# Patient Record
Sex: FEMALE | Race: WHITE | NOT HISPANIC OR LATINO | Employment: PART TIME | URBAN - NONMETROPOLITAN AREA
[De-identification: names, ages, dates, MRNs, and addresses within clinical notes are randomized per-mention and may not be internally consistent; named-entity substitution may affect disease eponyms.]

---

## 2017-01-06 ENCOUNTER — ROUTINE PRENATAL (OUTPATIENT)
Dept: OBSTETRICS AND GYNECOLOGY | Facility: CLINIC | Age: 27
End: 2017-01-06

## 2017-01-06 VITALS — WEIGHT: 225 LBS | SYSTOLIC BLOOD PRESSURE: 110 MMHG | BODY MASS INDEX: 43.94 KG/M2 | DIASTOLIC BLOOD PRESSURE: 80 MMHG

## 2017-01-06 DIAGNOSIS — Z34.03 ENCOUNTER FOR SUPERVISION OF NORMAL FIRST PREGNANCY IN THIRD TRIMESTER: Primary | ICD-10-CM

## 2017-01-06 PROCEDURE — 99213 OFFICE O/P EST LOW 20 MIN: CPT | Performed by: OBSTETRICS & GYNECOLOGY

## 2017-01-06 PROCEDURE — 76816 OB US FOLLOW-UP PER FETUS: CPT | Performed by: OBSTETRICS & GYNECOLOGY

## 2017-01-06 NOTE — MR AVS SNAPSHOT
Veda Fischer   2017 9:45 AM   Routine Prenatal    Dept Phone:  [phone]   Encounter #:  [id number]    Provider:  Curt Julien MD   Department:  [facility]                Your Full Care Plan              Your Updated Medication List          This list is accurate as of: 17 10:21 AM.  Always use your most recent med list.                PRENATAL VITAMINS PO               You Were Diagnosed With        Codes Comments    Encounter for supervision of normal first pregnancy in third trimester    -  Primary ICD-10-CM: Z34.03  ICD-9-CM: V22.0       Instructions     None    Patient Instructions History      Upcoming Appointments     Visit Type Date Time Department    OB FOLLOWUP 2017  9:45 AM [facility]    OB ULTRASOUND 2017 11:15 AM [facility]    OB FOLLOWUP 1/10/2017 10:15 AM [facility]    OB FOLLOWUP 2017  8:00 AM [facility]    OB [facility]UP 2017  9:45 AM [facility]      [employer] Signup     [facility] [employer] allows you to send messages to your doctor, view your test results, renew your prescriptions, schedule appointments, and more. To sign up, go to [employer] and click on the Sign Up Now link in the New User? box. Enter your [employer] Activation Code exactly as it appears below along with the last four digits of your Social Security Number and your Date of Birth () to complete the sign-up process. If you do not sign up before the expiration date, you must request a new code.    [employer] Activation Code: [email or web address]  Expires: 2017  4:36 AM    If you have questions, you can email [email or web address] or call [phone] to talk to our [employer] staff. Remember, [employer] is NOT to be used for urgent needs. For medical emergencies, dial 911.               Other Info from Your Visit           Your Appointments     2017 11:15 AM CST   OB ULTRASOUND with  ULTRASOUND 1 OBGYN [facility] (--)    [address] [id number] [phone]            José Miguel 10, 2017 10:15 AM CST   OB FOLLOWUP with Phill Ashley MD   [facility] (--)    [address] [id number] [phone]            Jan 17, 2017  8:00 AM CST   OB FOLLOWUP with Phill Ashley MD   [facility] (--)    [address] [id number] [phone]            Jan 20, 2017  9:45 AM CST   OB FOLLOWUP with Phill Ashley MD   [facility] (--)    [address] [id number] [phone]              Allergies     No Known Allergies      Reason for Visit     Routine Prenatal Visit           Vital Signs     Blood Pressure Weight Last Menstrual Period Body Mass Index Smoking Status       110/80 225 lb (102 kg) 04/13/2016 43.94 kg/m2 Former Smoker       Problems and Diagnoses Noted     Prenatal care

## 2017-01-06 NOTE — PROGRESS NOTES
US for size greater than dates  Cervix anterior and moderate, but head not engaged  Has appt on Tuesday, will discuss US results then

## 2017-01-10 ENCOUNTER — ROUTINE PRENATAL (OUTPATIENT)
Dept: OBSTETRICS AND GYNECOLOGY | Facility: CLINIC | Age: 27
End: 2017-01-10

## 2017-01-10 VITALS — BODY MASS INDEX: 43.94 KG/M2 | WEIGHT: 225 LBS | DIASTOLIC BLOOD PRESSURE: 78 MMHG | SYSTOLIC BLOOD PRESSURE: 114 MMHG

## 2017-01-10 DIAGNOSIS — F17.200 SMOKER: Primary | ICD-10-CM

## 2017-01-10 DIAGNOSIS — Z34.93 PRENATAL CARE, THIRD TRIMESTER: ICD-10-CM

## 2017-01-10 PROCEDURE — 99212 OFFICE O/P EST SF 10 MIN: CPT | Performed by: OBSTETRICS & GYNECOLOGY

## 2017-01-10 NOTE — PROGRESS NOTES
Kick count instructions were reviewed and encouraged.  Labor signs and symptoms were reviewed.  Patient was encouraged to come to hospital or call for bleeding, leakage of fluid or any other concerns.    8# EFW last week.  Wants expectant management.  Head still not engaged.

## 2017-01-10 NOTE — MR AVS SNAPSHOT
Veda Fischer   1/10/2017 10:15 AM   Routine Prenatal    Dept Phone:  [phone]   Encounter #:  [id number]    Provider:  Phill Ashley MD   Department:  [facility]                Your Full Care Plan              Your Updated Medication List          This list is accurate as of: 1/10/17 10:47 AM.  Always use your most recent med list.                PRENATAL VITAMINS PO               You Were Diagnosed With        Codes Comments    Smoker    -  Primary ICD-10-CM: F17.200  ICD-9-CM: 305.1     Prenatal care, third trimester     ICD-10-CM: Z34.93  ICD-9-CM: V22.1       Instructions     None    Patient Instructions History      Upcoming Appointments     Visit Type Date Time Department    OB FOLLOWUP 1/10/2017 10:15 [facility]    [facility] 2017  8:00 AM [facility]    [facility] FOLLOWUP 2017  9:45 AM [facility]      [employer] Signup     [facility] [employer] allows you to send messages to your doctor, view your test results, renew your prescriptions, schedule appointments, and more. To sign up, go to [employer] and click on the Sign Up Now link in the New User? box. Enter your [employer] Activation Code exactly as it appears below along with the last four digits of your Social Security Number and your Date of Birth () to complete the sign-up process. If you do not sign up before the expiration date, you must request a new code.    [employer] Activation Code: [email or web address]  Expires: 2017  4:36 AM    If you have questions, you can email [email or web address] or call [phone] to talk to our [employer] staff. Remember, [employer] is NOT to be used for urgent needs. For medical emergencies, dial 911.               Other Info from Your Visit           Your Appointments     2017  8:00 AM CST   OB FOLLOWUP with Phill Ashley MD   [facility] (--)    [address] [place] [address]  [id number] [phone]            Jan 20, 2017  9:45 AM CST   OB FOLLOWUP with Phill Ashley MD   [facility] (--)    [address] [place] [id number] [phone]              Allergies     No Known Allergies      Reason for Visit     Routine Prenatal Visit           Vital Signs     Blood Pressure Weight Last Menstrual Period Body Mass Index Smoking Status       114/78 225 lb (102 kg) 04/13/2016 43.94 kg/m2 Former Smoker       Problems and Diagnoses Noted     Smoker    -  Primary    Prenatal care

## 2017-01-14 PROBLEM — O47.9 IRREGULAR CONTRACTIONS: Status: ACTIVE | Noted: 2017-01-14

## 2017-01-16 ENCOUNTER — HOSPITAL ENCOUNTER (INPATIENT)
Facility: HOSPITAL | Age: 27
LOS: 4 days | Discharge: HOME | End: 2017-01-20
Attending: OBSTETRICS & GYNECOLOGY | Admitting: OBSTETRICS & GYNECOLOGY

## 2017-01-16 ENCOUNTER — ANESTHESIA (OUTPATIENT)
Dept: LABOR AND DELIVERY | Facility: HOSPITAL | Age: 27
End: 2017-01-16

## 2017-01-16 ENCOUNTER — ANESTHESIA EVENT (OUTPATIENT)
Dept: LABOR AND DELIVERY | Facility: HOSPITAL | Age: 27
End: 2017-01-16

## 2017-01-16 DIAGNOSIS — Z37.9 NORMAL LABOR: ICD-10-CM

## 2017-01-16 DIAGNOSIS — O33.4XX1: ICD-10-CM

## 2017-01-16 DIAGNOSIS — O47.9 IRREGULAR CONTRACTIONS: Primary | ICD-10-CM

## 2017-01-16 LAB
A1 MICROGLOB PLACENTAL VAG QL: NEGATIVE
ABO GROUP BLD: NORMAL
BLD GP AB SCN SERPL QL: NEGATIVE
DEPRECATED RDW RBC AUTO: 42.8 FL (ref 40–54)
ERYTHROCYTE [DISTWIDTH] IN BLOOD BY AUTOMATED COUNT: 14.4 % (ref 12–15)
HCT VFR BLD AUTO: 37.6 % (ref 37–47)
HGB BLD-MCNC: 12.7 G/DL (ref 12–16)
MCH RBC QN AUTO: 28 PG (ref 28–32)
MCHC RBC AUTO-ENTMCNC: 33.8 G/DL (ref 33–36)
MCV RBC AUTO: 82.8 FL (ref 82–98)
PLATELET # BLD AUTO: 245 10*3/MM3 (ref 130–400)
PMV BLD AUTO: 11.3 FL (ref 6–12)
RBC # BLD AUTO: 4.54 10*6/MM3 (ref 4.2–5.4)
RH BLD: POSITIVE
WBC NRBC COR # BLD: 17.99 10*3/MM3 (ref 4.8–10.8)

## 2017-01-16 PROCEDURE — 86900 BLOOD TYPING SEROLOGIC ABO: CPT

## 2017-01-16 PROCEDURE — 86850 RBC ANTIBODY SCREEN: CPT

## 2017-01-16 PROCEDURE — 25010000002 PENICILLIN G POTASSIUM PER 600000 UNITS: Performed by: OBSTETRICS & GYNECOLOGY

## 2017-01-16 PROCEDURE — 85027 COMPLETE CBC AUTOMATED: CPT | Performed by: OBSTETRICS & GYNECOLOGY

## 2017-01-16 PROCEDURE — 25010000002 BUTORPHANOL PER 1 MG: Performed by: OBSTETRICS & GYNECOLOGY

## 2017-01-16 PROCEDURE — 84112 EVAL AMNIOTIC FLUID PROTEIN: CPT | Performed by: OBSTETRICS & GYNECOLOGY

## 2017-01-16 PROCEDURE — 86901 BLOOD TYPING SEROLOGIC RH(D): CPT

## 2017-01-16 PROCEDURE — 25010000003 CEFAZOLIN PER 500 MG: Performed by: OBSTETRICS & GYNECOLOGY

## 2017-01-16 PROCEDURE — 86920 COMPATIBILITY TEST SPIN: CPT

## 2017-01-16 PROCEDURE — C1765 ADHESION BARRIER: HCPCS | Performed by: OBSTETRICS & GYNECOLOGY

## 2017-01-16 RX ORDER — PROMETHAZINE HYDROCHLORIDE 25 MG/ML
12.5 INJECTION, SOLUTION INTRAMUSCULAR; INTRAVENOUS EVERY 6 HOURS PRN
Status: DISCONTINUED | OUTPATIENT
Start: 2017-01-16 | End: 2017-01-17 | Stop reason: HOSPADM

## 2017-01-16 RX ORDER — PROMETHAZINE HYDROCHLORIDE 25 MG/1
12.5 TABLET ORAL EVERY 6 HOURS PRN
Status: DISCONTINUED | OUTPATIENT
Start: 2017-01-16 | End: 2017-01-17 | Stop reason: HOSPADM

## 2017-01-16 RX ORDER — SODIUM CHLORIDE 0.9 % (FLUSH) 0.9 %
1-10 SYRINGE (ML) INJECTION AS NEEDED
Status: DISCONTINUED | OUTPATIENT
Start: 2017-01-16 | End: 2017-01-17 | Stop reason: HOSPADM

## 2017-01-16 RX ORDER — MISOPROSTOL 200 UG/1
800 TABLET ORAL AS NEEDED
Status: DISCONTINUED | OUTPATIENT
Start: 2017-01-16 | End: 2017-01-17 | Stop reason: HOSPADM

## 2017-01-16 RX ORDER — LIDOCAINE HYDROCHLORIDE AND EPINEPHRINE BITARTRATE 20; .01 MG/ML; MG/ML
INJECTION, SOLUTION SUBCUTANEOUS AS NEEDED
Status: DISCONTINUED | OUTPATIENT
Start: 2017-01-16 | End: 2017-01-17 | Stop reason: SURG

## 2017-01-16 RX ORDER — TERBUTALINE SULFATE 1 MG/ML
0.25 INJECTION, SOLUTION SUBCUTANEOUS AS NEEDED
Status: DISCONTINUED | OUTPATIENT
Start: 2017-01-16 | End: 2017-01-17 | Stop reason: HOSPADM

## 2017-01-16 RX ORDER — FAMOTIDINE 20 MG/1
20 TABLET, FILM COATED ORAL 2 TIMES DAILY
Status: DISCONTINUED | OUTPATIENT
Start: 2017-01-16 | End: 2017-01-17

## 2017-01-16 RX ORDER — LIDOCAINE HYDROCHLORIDE 10 MG/ML
5 INJECTION, SOLUTION INFILTRATION; PERINEURAL AS NEEDED
Status: DISCONTINUED | OUTPATIENT
Start: 2017-01-16 | End: 2017-01-17 | Stop reason: HOSPADM

## 2017-01-16 RX ORDER — SODIUM CHLORIDE, SODIUM LACTATE, POTASSIUM CHLORIDE, CALCIUM CHLORIDE 600; 310; 30; 20 MG/100ML; MG/100ML; MG/100ML; MG/100ML
125 INJECTION, SOLUTION INTRAVENOUS CONTINUOUS
Status: DISCONTINUED | OUTPATIENT
Start: 2017-01-16 | End: 2017-01-17

## 2017-01-16 RX ORDER — FAMOTIDINE 20 MG/1
TABLET, FILM COATED ORAL
Status: COMPLETED
Start: 2017-01-16 | End: 2017-01-16

## 2017-01-16 RX ORDER — OXYTOCIN/RINGER'S LACTATE 20/1000 ML
125 PLASTIC BAG, INJECTION (ML) INTRAVENOUS AS NEEDED
Status: DISCONTINUED | OUTPATIENT
Start: 2017-01-16 | End: 2017-01-17 | Stop reason: HOSPADM

## 2017-01-16 RX ORDER — LIDOCAINE HYDROCHLORIDE 20 MG/ML
INJECTION, SOLUTION EPIDURAL; INFILTRATION; INTRACAUDAL; PERINEURAL AS NEEDED
Status: DISCONTINUED | OUTPATIENT
Start: 2017-01-16 | End: 2017-01-17 | Stop reason: SURG

## 2017-01-16 RX ORDER — PENICILLIN G 3000000 [IU]/50ML
3 INJECTION, SOLUTION INTRAVENOUS EVERY 4 HOURS
Status: DISCONTINUED | OUTPATIENT
Start: 2017-01-16 | End: 2017-01-17 | Stop reason: HOSPADM

## 2017-01-16 RX ORDER — FAMOTIDINE 10 MG/ML
20 INJECTION, SOLUTION INTRAVENOUS 2 TIMES DAILY
Status: DISCONTINUED | OUTPATIENT
Start: 2017-01-16 | End: 2017-01-17

## 2017-01-16 RX ORDER — PHENYLEPHRINE HCL IN 0.9% NACL 0.8MG/10ML
SYRINGE (ML) INTRAVENOUS AS NEEDED
Status: DISCONTINUED | OUTPATIENT
Start: 2017-01-16 | End: 2017-01-17 | Stop reason: SURG

## 2017-01-16 RX ORDER — LIDOCAINE HYDROCHLORIDE 10 MG/ML
INJECTION, SOLUTION INFILTRATION; PERINEURAL AS NEEDED
Status: DISCONTINUED | OUTPATIENT
Start: 2017-01-16 | End: 2017-01-17 | Stop reason: SURG

## 2017-01-16 RX ORDER — PROMETHAZINE HYDROCHLORIDE 12.5 MG/1
12.5 SUPPOSITORY RECTAL EVERY 6 HOURS PRN
Status: DISCONTINUED | OUTPATIENT
Start: 2017-01-16 | End: 2017-01-17 | Stop reason: HOSPADM

## 2017-01-16 RX ORDER — METHYLERGONOVINE MALEATE 0.2 MG/ML
200 INJECTION INTRAVENOUS AS NEEDED
Status: DISCONTINUED | OUTPATIENT
Start: 2017-01-16 | End: 2017-01-17 | Stop reason: HOSPADM

## 2017-01-16 RX ORDER — BUTORPHANOL TARTRATE 1 MG/ML
1 INJECTION, SOLUTION INTRAMUSCULAR; INTRAVENOUS
Status: DISCONTINUED | OUTPATIENT
Start: 2017-01-16 | End: 2017-01-17 | Stop reason: HOSPADM

## 2017-01-16 RX ORDER — LIDOCAINE HYDROCHLORIDE AND EPINEPHRINE 15; 5 MG/ML; UG/ML
INJECTION, SOLUTION EPIDURAL AS NEEDED
Status: DISCONTINUED | OUTPATIENT
Start: 2017-01-16 | End: 2017-01-17 | Stop reason: SURG

## 2017-01-16 RX ORDER — OXYTOCIN/RINGER'S LACTATE 20/1000 ML
999 PLASTIC BAG, INJECTION (ML) INTRAVENOUS ONCE
Status: DISCONTINUED | OUTPATIENT
Start: 2017-01-16 | End: 2017-01-17 | Stop reason: HOSPADM

## 2017-01-16 RX ADMIN — PENICILLIN G 3 MILLION UNITS: 3000000 INJECTION, SOLUTION INTRAVENOUS at 20:26

## 2017-01-16 RX ADMIN — LIDOCAINE HYDROCHLORIDE 3 ML: 10 INJECTION, SOLUTION INFILTRATION; PERINEURAL at 18:58

## 2017-01-16 RX ADMIN — LIDOCAINE HYDROCHLORIDE 5 ML: 20 INJECTION, SOLUTION EPIDURAL; INFILTRATION; INTRACAUDAL; PERINEURAL at 19:12

## 2017-01-16 RX ADMIN — SODIUM CHLORIDE, POTASSIUM CHLORIDE, SODIUM LACTATE AND CALCIUM CHLORIDE 125 ML/HR: 600; 310; 30; 20 INJECTION, SOLUTION INTRAVENOUS at 19:42

## 2017-01-16 RX ADMIN — LIDOCAINE HYDROCHLORIDE,EPINEPHRINE BITARTRATE 5 ML: 20; .01 INJECTION, SOLUTION INFILTRATION; PERINEURAL at 23:39

## 2017-01-16 RX ADMIN — SODIUM CHLORIDE, POTASSIUM CHLORIDE, SODIUM LACTATE AND CALCIUM CHLORIDE 1000 ML: 600; 310; 30; 20 INJECTION, SOLUTION INTRAVENOUS at 18:46

## 2017-01-16 RX ADMIN — Medication 2 TABLET: at 23:25

## 2017-01-16 RX ADMIN — PENICILLIN G POTASSIUM 5 MILLION UNITS: 5000000 POWDER, FOR SOLUTION INTRAMUSCULAR; INTRAPLEURAL; INTRATHECAL; INTRAVENOUS at 13:07

## 2017-01-16 RX ADMIN — CEFAZOLIN 2 G: 1 INJECTION, POWDER, FOR SOLUTION INTRAVENOUS at 23:42

## 2017-01-16 RX ADMIN — FAMOTIDINE 20 MG: 20 TABLET, FILM COATED ORAL at 23:23

## 2017-01-16 RX ADMIN — LIDOCAINE HYDROCHLORIDE,EPINEPHRINE BITARTRATE 5 ML: 20; .01 INJECTION, SOLUTION INFILTRATION; PERINEURAL at 23:42

## 2017-01-16 RX ADMIN — PENICILLIN G 3 MILLION UNITS: 3000000 INJECTION, SOLUTION INTRAVENOUS at 16:53

## 2017-01-16 RX ADMIN — LIDOCAINE HYDROCHLORIDE 5 ML: 20 INJECTION, SOLUTION EPIDURAL; INFILTRATION; INTRACAUDAL; PERINEURAL at 19:22

## 2017-01-16 RX ADMIN — Medication 160 MCG: at 23:46

## 2017-01-16 RX ADMIN — OXYTOCIN 20 UNITS: 10 INJECTION INTRAVENOUS at 23:54

## 2017-01-16 RX ADMIN — LIDOCAINE HYDROCHLORIDE,EPINEPHRINE BITARTRATE 3 ML: 15; .005 INJECTION, SOLUTION EPIDURAL; INFILTRATION; INTRACAUDAL; PERINEURAL at 19:05

## 2017-01-16 RX ADMIN — LIDOCAINE HYDROCHLORIDE,EPINEPHRINE BITARTRATE 5 ML: 20; .01 INJECTION, SOLUTION INFILTRATION; PERINEURAL at 23:36

## 2017-01-16 RX ADMIN — BUTORPHANOL TARTRATE 1 MG: 1 INJECTION, SOLUTION INTRAMUSCULAR; INTRAVENOUS at 16:58

## 2017-01-16 RX ADMIN — BUTORPHANOL TARTRATE 1 MG: 1 INJECTION, SOLUTION INTRAMUSCULAR; INTRAVENOUS at 14:03

## 2017-01-16 NOTE — H&P
[facility]  Veda Fischer  : 1990  MRN: [id number]  CSN: [id number]    History and Physical    Subjective   Veda Fischer is a 26 y.o. year old  with an Estimated Date of Delivery: 17 currently at 39w5d presenting with regular contractions occuring every 5.    Prenatal care has been with . regular contractions occuring every 5.  It has been benign.    Obstetric History       T0      TAB0   SAB0   E0   M0   L0       # Outcome Date GA Lbr Marcos/2nd Weight Sex Delivery Anes PTL Lv   1 Current                   Past Medical History   Diagnosis Date   • Encounter to determine fetal viability of pregnancy    • Migraine    • Pregnancy      NORMAL       History reviewed. No pertinent past surgical history.      Current Facility-Administered Medications:   •  butorphanol (STADOL) injection 1 mg, 1 mg, Intravenous, Q3H PRN, Phill Ashley MD  •  butorphanol (STADOL) injection 2 mg, 2 mg, Intravenous, Q3H PRN, Phill Ashley MD  •  famotidine (PEPCID) injection 20 mg, 20 mg, Intravenous, BID **OR** famotidine (PEPCID) tablet 20 mg, 20 mg, Oral, BID, Phill Ashley MD  •  lactated ringers bolus 1,000 mL, 1,000 mL, Intravenous, Once, Phill Ashley MD  •  lactated ringers infusion, 125 mL/hr, Intravenous, Continuous, Phill Ashley MD  •  lidocaine (XYLOCAINE) injection 5 mL, 5 mL, Intradermal, PRN, Phill Ashley MD  •  penicillin g 5 mu/100 mL 0.9% NS IVPB (mbp), 5 Million Units, Intravenous, Once **FOLLOWED BY** penicillin G potassium IVPB 3 Million Units, 3 Million Units, Intravenous, Q4H, Phill Ashley MD  •  promethazine (PHENERGAN) injection 12.5 mg, 12.5 mg, Intravenous, Q6H PRN **OR** promethazine (PHENERGAN) injection 12.5 mg, 12.5 mg, Intramuscular, Q6H PRN **OR** promethazine (PHENERGAN) suppository 12.5 mg, 12.5 mg, Rectal, Q6H PRN **OR** promethazine (PHENERGAN) tablet 12.5 mg, 12.5 mg, Oral, Q6H PRN, Phill Ashley MD  •  sodium chloride 0.9 % flush 1-10 mL,  "1-10 mL, Intravenous, PRN, Phill Ashley MD  •  terbutaline (BRETHINE) injection 0.25 mg, 0.25 mg, Subcutaneous, PRN, Phill Ashley MD    Allergies   Allergen Reactions   • Nuts Anaphylaxis     ALMONDS       Family History   Problem Relation Age of Onset   • Cancer Mother      OVARIAN   • Lymphoma Sister    • Cancer Maternal Grandmother      BREAST   • Diabetes Other    • Kidney disease Other        Social History   Substance Use Topics   • Smoking status: Former Smoker     Quit date: 5/1/2016   • Smokeless tobacco: None   • Alcohol use No       Review of Systems        Objective   Visit Vitals   • /83 (BP Location: Right arm, Patient Position: Sitting)   • Pulse 104   • Temp 97.7 °F (36.5 °C) (Temporal Artery )   • Resp 18   • Ht 60\" (152.4 cm)   • Wt 227 lb (103 kg)   • LMP 04/13/2016   • Breastfeeding Yes   • BMI 44.33 kg/m2     General: well developed; well nourished  no acute distress   Heart: regular rate and rhythm, S1, S2 normal, no murmur, click, rub or gallop   Lungs: breathing is unlabored   Abdomen: soft, non-tender; no masses  no umbilical or inginual hernias are present  no hepato-splenomegaly   FHT's: reactive and category 1  external monitors used   Cervix: was checked: 4 cm / 100 % / -2   Presentation: cephalic   Contractions: regular     Prenatal Labs  Lab Results   Component Value Date    HGB 11.7 (L) 10/26/2016    RUBELLASCRN Negative 07/06/2016    HEPBSAG Negative 07/06/2016    ABO O 07/06/2016    RH Positive 07/06/2016    IRNTTOV0RS 97 10/26/2016    GBSANTIGEN Positive 12/23/2016       Current Labs Reviewed   No data reviewed       Assessment   1. IUP at 39w5d  2. Group B strep status: positive  3. FSR     Plan   1. Expectant management  2. OK for epidural    Phill Ashley MD  1/16/2017  12:46 PM     "

## 2017-01-16 NOTE — IP AVS SNAPSHOT
AFTER VISIT SUMMARY             Veda Fischer           About your hospitalization     You were admitted on:  2017 You last received care in the:  [facility]       Procedures & Surgeries      Procedure(s) (LRB):   SECTION PRIMARY (N/A)     2017 - 2017     Surgeon(s):  Phill Ashley MD  -------------------      Medications    If you or your caregiver advised us that you are currently taking a medication and that medication is marked below as “Resume”, this simply indicates that we have reviewed those medications to make sure our new therapy recommendations do not interfere.  If you have concerns about medications other than those new ones which we are prescribing today, please consult the physician who prescribed them (or your primary physician).  Our review of your home medications is not meant to indicate that we are directing their use.             Your Medications      START taking these medications     oxyCODONE-acetaminophen 7.5-325 MG per tablet   Take 1 tablet by mouth Every 4 (Four) Hours As Needed for moderate pain (4-6) for up to 8 days.   Last time this was given:  2017  9:22 AM   Commonly known as:  PERCOCET             CONTINUE taking these medications     PRENATAL VITAMINS PO   Take 1 tablet by mouth daily.                Where to Get Your Medications      You can get these medications from any pharmacy     Bring a paper prescription for each of these medications     oxyCODONE-acetaminophen 7.5-325 MG per tablet                  Your Medications      Your Medication List           Morning Noon Evening Bedtime As Needed    oxyCODONE-acetaminophen 7.5-325 MG per tablet   Take 1 tablet by mouth Every 4 (Four) Hours As Needed for moderate pain (4-6) for up to 8 days.   Commonly known as:  PERCOCET                                PRENATAL VITAMINS PO   Take 1 tablet by mouth daily.                                         Instructions for  After Discharge        Activity Instructions     Discharge Activity Restrictions       1) No driving for 2 weeks and no longer taking narcotics.   2) Return to school / work in 6 weeks.  3) No tub baths until cleared by physician.  4) Do not lift / push / pull more then 10 lbs.       Pelvic Rest       Until cleared by physician at follow up appointment.             Diet Instructions     Diet: Regular; Thin Liquids, No Restrictions       Discharge Diet:  Regular   Fluid Consistency:  Thin Liquids, No Restrictions             Other Instructions     Discharge Dressing / Wound Instructions       If staples present, please return to office as directed for removal.             Discharge References/Attachments     POSTPARTUM DEPRESSION AND BABY BLUES (ENGLISH)    BREAST SELF-AWARENESS, EASY-TO-READ (ENGLISH)    ACETAMINOPHEN; OXYCODONE TABLETS (ENGLISH)    IBUPROFEN TABLETS AND CAPSULES (ENGLISH)    PELVIC REST (ENGLISH)     DELIVERY, CARE AFTER (ENGLISH)       Follow-ups for After Discharge        Follow-up Information     Follow up with Phill Ashley MD Follow up in 2 week(s).    Specialties:  Obstetrics and Gynecology, Gynecology    Contact information:    [address] [place] [address] [id number] [phone]        Referrals and Follow-ups to Schedule     Call MD With Problems / Concerns    As directed    Call office for any questions or concerns including but not limited to heavy vaginal bleeding, increased pain, fever, depressed mood, opening of wound, drainage from incision or redness/swelling around incision.       Follow-Up    As directed    2 weeks with Dion             Scheduled Appointments     2017 11:00 AM CST   Subsequent Lactation Consult with PAD LACTATION ROOM   [facility] ([place])    [address] [place] [id number] [phone]            2017 11:00 AM CST   Postpartum with Phill Ashley MD   [facility]  (--)    [address]  [facility] [id number] [phone]              [employer] Signup     [facility] [employer] allows you to send messages to your doctor, view your test results, renew your prescriptions, schedule appointments, and more. To sign up, go to [employer] and click on the Sign Up Now link in the New User? box. Enter your WizIQ Activation Code exactly as it appears below along with the last four digits of your Social Security Number and your Date of Birth () to complete the sign-up process. If you do not sign up before the expiration date, you must request a new code.    WizIQ Activation Code: [email or web address]  Expires: 2/3/2017 11:36 AM    If you have questions, you can email [email or web address] or call [phone] to talk to our WizIQ staff. Remember, WizIQ is NOT to be used for urgent needs. For medical emergencies, dial 911.           Summary of Your Hospitalization        Reason for Hospitalization     Your primary diagnosis was:  Normal Labor      Care Providers     Provider Service Role Specialty    Anne Castro MD -- Attending Provider Obstetrics and Gynecology    Phill Ashley MD -- Consulting Physician  Obstetrics and Gynecology    Phill Ashley MD -- Surgeon  Obstetrics and Gynecology      Your Allergies  Date Reviewed: 2017    Allergen Reactions    Nuts Anaphylaxis    ALMONDS      Patient Belongings Returned     Document Return of Belongings Flowsheet     Were the patient bedside belongings sent home?   --   Belongings Retrieved from Security & Sent Home   --    Belongings Sent to Safe   --   Medications Retrieved from Pharmacy & Sent Home   --              More Information      Postpartum Depression and Baby Blues  The postpartum period begins right after the birth of a baby. During this time, there is often a great amount of ephraim and excitement. It is also a time of many changes in the life of the parents. Regardless of how many  "times a mother gives birth, each child brings new challenges and dynamics to the family. It is not unusual to have feelings of excitement along with confusing shifts in moods, emotions, and thoughts. All mothers are at risk of developing postpartum depression or the \"baby blues.\" These mood changes can occur right after giving birth, or they may occur many months after giving birth. The baby blues or postpartum depression can be mild or severe. Additionally, postpartum depression can go away rather quickly, or it can be a long-term condition.   CAUSES  Raised hormone levels and the rapid drop in those levels are thought to be a main cause of postpartum depression and the baby blues. A number of hormones change during and after pregnancy. Estrogen and progesterone usually decrease right after the delivery of your baby. The levels of thyroid hormone and various cortisol steroids also rapidly drop. Other factors that play a role in these mood changes include major life events and genetics.   RISK FACTORS  If you have any of the following risks for the baby blues or postpartum depression, know what symptoms to watch out for during the postpartum period. Risk factors that may increase the likelihood of getting the baby blues or postpartum depression include:  · Having a personal or family history of depression.    · Having depression while being pregnant.    · Having premenstrual mood issues or mood issues related to oral contraceptives.  · Having a lot of life stress.    · Having marital conflict.    · Lacking a social support network.    · Having a baby with special needs.    · Having health problems, such as diabetes.    SIGNS AND SYMPTOMS  Symptoms of baby blues include:  · Brief changes in mood, such as going from extreme happiness to sadness.  · Decreased concentration.    · Difficulty sleeping.    · Crying spells, tearfulness.    · Irritability.    · Anxiety.    Symptoms of postpartum depression typically begin " within the first month after giving birth. These symptoms include:  · Difficulty sleeping or excessive sleepiness.    · Marked weight loss.    · Agitation.    · Feelings of worthlessness.    · Lack of interest in activity or food.    Postpartum psychosis is a very serious condition and can be dangerous. Fortunately, it is rare. Displaying any of the following symptoms is cause for immediate medical attention. Symptoms of postpartum psychosis include:   · Hallucinations and delusions.    · Bizarre or disorganized behavior.    · Confusion or disorientation.    DIAGNOSIS   A diagnosis is made by an evaluation of your symptoms. There are no medical or lab tests that lead to a diagnosis, but there are various questionnaires that a health care provider may use to identify those with the baby blues, postpartum depression, or psychosis. Often, a screening tool called the [place]  Depression Scale is used to diagnose depression in the postpartum period.   TREATMENT  The baby blues usually goes away on its own in 1-2 weeks. Social support is often all that is needed. You will be encouraged to get adequate sleep and rest. Occasionally, you may be given medicines to help you sleep.   Postpartum depression requires treatment because it can last several months or longer if it is not treated. Treatment may include individual or group therapy, medicine, or both to address any social, physiological, and psychological factors that may play a role in the depression. Regular exercise, a healthy diet, rest, and social support may also be strongly recommended.   Postpartum psychosis is more serious and needs treatment right away. Hospitalization is often needed.  HOME CARE INSTRUCTIONS  · Get as much rest as you can. Nap when the baby sleeps.    · Exercise regularly. Some women find yoga and walking to be beneficial.    · Eat a balanced and nourishing diet.    · Do little things that you enjoy. Have a cup of tea, take a bubble  bath, read your favorite magazine, or listen to your favorite music.  · Avoid alcohol.    · Ask for help with household chores, cooking, grocery shopping, or running errands as needed. Do not try to do everything.    · Talk to people close to you about how you are feeling. Get support from your partner, family members, friends, or other new moms.  · Try to stay positive in how you think. Think about the things you are grateful for.    · Do not spend a lot of time alone.    · Only take over-the-counter or prescription medicine as directed by your health care provider.  · Keep all your postpartum appointments.    · Let your health care provider know if you have any concerns.    SEEK MEDICAL CARE IF:  You are having a reaction to or problems with your medicine.  SEEK IMMEDIATE MEDICAL CARE IF:  · You have suicidal feelings.    · You think you may harm the baby or someone else.  MAKE SURE YOU:  · Understand these instructions.  · Will watch your condition.  · Will get help right away if you are not doing well or get worse.     This information is not intended to replace advice given to you by your health care provider. Make sure you discuss any questions you have with your health care provider.     Document Released: 09/21/2005 Document Revised: 12/23/2014 Document Reviewed: 09/29/2014  [employer] Interactive Patient Education ©2016 [employer]          Breast Self-Awareness  Breast self-awareness allows you to notice a breast problem early while it is still small. Do a breast self-exam:  · Every month, 5-7 days after your period (menstrual period).  · At the same time each month if you do not have periods anymore.  Look for any:  · Difference between your breasts (size, shape, or position).  · Change in breast shape or size.  · Fluid or blood coming from your nipples.  · Changes in your nipples (dimpling, nipple movement).  ·  Change in skin color or texture (redness, scaly areas).  Feel  for:  · Lumps.  · Bumps.  · Dips.  · Any other changes.  HOW TO DO A BREAST SELF-EXAM  Look at your breasts and nipples.  1. Take off all your clothes above your waist.  2.  front of a mirror in a room with good lighting.  3. Put your hands on your hips and push your hands downward.  Feel your breasts.   1. Lie flat on your back or  the shower or tub. If you are in the shower or tub, have wet, soapy hands.  2. Place your right arm above your head.  3. Place your left hand in the right underarm area.  4. Make small circles using the pads (not the fingertips) of your 3 middle fingers. Press lightly and then with medium and firm pressure.  5. Move your fingers a little lower and make the small circles at the 3 pressures (light, medium, and firm).  6. Continue moving your fingers lower and making circles until you reach the bottom of your breast.  7. Move your fingers one finger-width towards the center of the body.  8. Continue making the circles, this time moving upward until you reach the bottom of your neck.  9. Move your fingers one finger-width towards the center of your body.  10. Make circles downward when starting at the bottom of the neck. Make circles upward when starting at the bottom of the breast. Stop when you reach the middle of the chest.  11.  Repeat these steps on the other breast.  Write down what looks and feels normal for each breast. Also write down any changes you notice.  GET HELP RIGHT AWAY IF:  · You see any changes in your breasts or nipples.  · You see skin changes.  · You have unusual discharge from your nipples.  · You feel a new lump.  · You feel unusually thick areas.     This information is not intended to replace advice given to you by your health care provider. Make sure you discuss any questions you have with your health care provider.     Document Released: 06/05/2009 Document Revised: 12/04/2013 Document Reviewed: 04/03/2013  [employer] Interactive Patient Education  ©2016 [employer]          Acetaminophen; Oxycodone tablets  What is this medicine?  ACETAMINOPHEN; OXYCODONE (a set a AHSAN abad fen; ox i KOE done) is a pain reliever. It is used to treat moderate to severe pain.  This medicine may be used for other purposes; ask your health care provider or pharmacist if you have questions.  What should I tell my health care provider before I take this medicine?  They need to know if you have any of these conditions:  -brain tumor  -Crohn's disease, inflammatory bowel disease, or ulcerative colitis  -drug abuse or addiction  -head injury  -heart or circulation problems  -if you often drink alcohol  -kidney disease or problems going to the bathroom  -liver disease  -lung disease, asthma, or breathing problems  -an unusual or allergic reaction to acetaminophen, oxycodone, other opioid analgesics, other medicines, foods, dyes, or preservatives  -pregnant or trying to get pregnant  -breast-feeding  How should I use this medicine?  Take this medicine by mouth with a full glass of water. Follow the directions on the prescription label. You can take it with or without food. If it upsets your stomach, take it with food. Take your medicine at regular intervals. Do not take it more often than directed.  Talk to your pediatrician regarding the use of this medicine in children. Special care may be needed.  Patients over 65 years old may have a stronger reaction and need a smaller dose.  Overdosage: If you think you have taken too much of this medicine contact a poison control center or emergency room at once.  NOTE: This medicine is only for you. Do not share this medicine with others.  What if I miss a dose?  If you miss a dose, take it as soon as you can. If it is almost time for your next dose, take only that dose. Do not take double or extra doses.  What may interact with this medicine?  -alcohol  -antihistamines  -barbiturates like amobarbital, butalbital, butabarbital, methohexital,  pentobarbital, phenobarbital, thiopental, and secobarbital  -benztropine  -drugs for bladder problems like solifenacin, trospium, oxybutynin, tolterodine, hyoscyamine, and methscopolamine  -drugs for breathing problems like ipratropium and tiotropium  -drugs for certain stomach or intestine problems like propantheline, homatropine methylbromide, glycopyrrolate, atropine, belladonna, and dicyclomine  -general anesthetics like etomidate, ketamine, nitrous oxide, propofol, desflurane, enflurane, halothane, isoflurane, and sevoflurane  -medicines for depression, anxiety, or psychotic disturbances  -medicines for sleep  -muscle relaxants  -naltrexone  -narcotic medicines (opiates) for pain  -phenothiazines like perphenazine, thioridazine, chlorpromazine, mesoridazine, fluphenazine, prochlorperazine, promazine, and trifluoperazine  -scopolamine  -tramadol  -trihexyphenidyl  This list may not describe all possible interactions. Give your health care provider a list of all the medicines, herbs, non-prescription drugs, or dietary supplements you use. Also tell them if you smoke, drink alcohol, or use illegal drugs. Some items may interact with your medicine.  What should I watch for while using this medicine?  Tell your doctor or health care professional if your pain does not go away, if it gets worse, or if you have new or a different type of pain. You may develop tolerance to the medicine. Tolerance means that you will need a higher dose of the medication for pain relief. Tolerance is normal and is expected if you take this medicine for a long time.  Do not suddenly stop taking your medicine because you may develop a severe reaction. Your body becomes used to the medicine. This does NOT mean you are addicted. Addiction is a behavior related to getting and using a drug for a non-medical reason. If you have pain, you have a medical reason to take pain medicine. Your doctor will tell you how much medicine to take. If your  doctor wants you to stop the medicine, the dose will be slowly lowered over time to avoid any side effects.  You may get drowsy or dizzy. Do not drive, use machinery, or do anything that needs mental alertness until you know how this medicine affects you. Do not stand or sit up quickly, especially if you are an older patient. This reduces the risk of dizzy or fainting spells. Alcohol may interfere with the effect of this medicine. Avoid alcoholic drinks.  There are different types of narcotic medicines (opiates) for pain. If you take more than one type at the same time, you may have more side effects. Give your health care provider a list of all medicines you use. Your doctor will tell you how much medicine to take. Do not take more medicine than directed. Call emergency for help if you have problems breathing.  The medicine will cause constipation. Try to have a bowel movement at least every 2 to 3 days. If you do not have a bowel movement for 3 days, call your doctor or health care professional.  Do not take Tylenol (acetaminophen) or medicines that have acetaminophen with this medicine. Too much acetaminophen can be very dangerous. Many nonprescription medicines contain acetaminophen. Always read the labels carefully to avoid taking more acetaminophen.  What side effects may I notice from receiving this medicine?  Side effects that you should report to your doctor or health care professional as soon as possible:  -allergic reactions like skin rash, itching or hives, swelling of the face, lips, or tongue  -breathing difficulties, wheezing  -confusion  -light headedness or fainting spells  -severe stomach pain  -unusually weak or tired  -yellowing of the skin or the whites of the eyes  Side effects that usually do not require medical attention (report to your doctor or health care professional if they continue or are bothersome):  -dizziness  -drowsiness  -nausea  -vomiting  This list may not describe all possible  side effects. Call your doctor for medical advice about side effects. You may report side effects to FDA at 9-314-FDA-9580.  Where should I keep my medicine?  Keep out of the reach of children. This medicine can be abused. Keep your medicine in a safe place to protect it from theft. Do not share this medicine with anyone. Selling or giving away this medicine is dangerous and against the law.  This medicine may cause accidental overdose and death if it taken by other adults, children, or pets. Mix any unused medicine with a substance like cat litter or coffee grounds. Then throw the medicine away in a sealed container like a sealed bag or a coffee can with a lid. Do not use the medicine after the expiration date.  Store at room temperature between 20 and 25 degrees C (68 and 77 degrees F).  NOTE: This sheet is a summary. It may not cover all possible information. If you have questions about this medicine, talk to your doctor, pharmacist, or health care provider.     © 2016, [employer]. (2015-11-18 15:18:46)          Ibuprofen tablets and capsules  What is this medicine?  IBUPROFEN (eye BYOO proe fen) is a non-steroidal anti-inflammatory drug (NSAID). It is used for dental pain, fever, headaches or migraines, osteoarthritis, rheumatoid arthritis, or painful monthly periods. It can also relieve minor aches and pains caused by a cold, flu, or sore throat.  This medicine may be used for other purposes; ask your health care provider or pharmacist if you have questions.  What should I tell my health care provider before I take this medicine?  They need to know if you have any of these conditions:  -asthma  -cigarette smoker  -drink more than 3 alcohol containing drinks a day  -heart disease or circulation problems such as heart failure or leg edema (fluid retention)  -high blood pressure  -kidney disease  -liver disease  -stomach bleeding or ulcers  -an unusual or allergic reaction to ibuprofen, aspirin, other  NSAIDS, other medicines, foods, dyes, or preservatives  -pregnant or trying to get pregnant  -breast-feeding  How should I use this medicine?  Take this medicine by mouth with a glass of water. Follow the directions on the prescription label. Take this medicine with food if your stomach gets upset. Try to not lie down for at least 10 minutes after you take the medicine. Take your medicine at regular intervals. Do not take your medicine more often than directed.  A special MedGuide will be given to you by the pharmacist with each prescription and refill. Be sure to read this information carefully each time.  Talk to your pediatrician regarding the use of this medicine in children. Special care may be needed.  Overdosage: If you think you have taken too much of this medicine contact a poison control center or emergency room at once.  NOTE: This medicine is only for you. Do not share this medicine with others.  What if I miss a dose?  If you miss a dose, take it as soon as you can. If it is almost time for your next dose, take only that dose. Do not take double or extra doses.  What may interact with this medicine?  Do not take this medicine with any of the following medications:  -cidofovir  -ketorolac  -methotrexate  -pemetrexed  This medicine may also interact with the following medications:  -alcohol  -aspirin  -diuretics  -lithium  -other drugs for inflammation like prednisone  -warfarin  This list may not describe all possible interactions. Give your health care provider a list of all the medicines, herbs, non-prescription drugs, or dietary supplements you use. Also tell them if you smoke, drink alcohol, or use illegal drugs. Some items may interact with your medicine.  What should I watch for while using this medicine?  Tell your doctor or healthcare professional if your symptoms do not start to get better or if they get worse.  This medicine does not prevent heart attack or stroke. In fact, this medicine may  increase the chance of a heart attack or stroke. The chance may increase with longer use of this medicine and in people who have heart disease. If you take aspirin to prevent heart attack or stroke, talk with your doctor or health care professional.  Do not take other medicines that contain aspirin, ibuprofen, or naproxen with this medicine. Side effects such as stomach upset, nausea, or ulcers may be more likely to occur. Many medicines available without a prescription should not be taken with this medicine.  This medicine can cause ulcers and bleeding in the stomach and intestines at any time during treatment. Ulcers and bleeding can happen without warning symptoms and can cause death. To reduce your risk, do not smoke cigarettes or drink alcohol while you are taking this medicine.  You may get drowsy or dizzy. Do not drive, use machinery, or do anything that needs mental alertness until you know how this medicine affects you. Do not stand or sit up quickly, especially if you are an older patient. This reduces the risk of dizzy or fainting spells.  This medicine can cause you to bleed more easily. Try to avoid damage to your teeth and gums when you brush or floss your teeth.  This medicine may be used to treat migraines. If you take migraine medicines for 10 or more days a month, your migraines may get worse. Keep a diary of headache days and medicine use. Contact your healthcare professional if your migraine attacks occur more frequently.  What side effects may I notice from receiving this medicine?  Side effects that you should report to your doctor or health care professional as soon as possible:  -allergic reactions like skin rash, itching or hives, swelling of the face, lips, or tongue  -severe stomach pain  -signs and symptoms of bleeding such as bloody or black, tarry stools; red or dark-brown urine; spitting up blood or brown material that looks like coffee grounds; red spots on the skin; unusual bruising  or bleeding from the eye, gums, or nose  -signs and symptoms of a blood clot such as changes in vision; chest pain; severe, sudden headache; trouble speaking; sudden numbness or weakness of the face, arm, or leg  -unexplained weight gain or swelling  -unusually weak or tired  -yellowing of eyes or skin  Side effects that usually do not require medical attention (report to your doctor or health care professional if they continue or are bothersome):  -bruising  -diarrhea  -dizziness, drowsiness  -headache  -nausea, vomiting  This list may not describe all possible side effects. Call your doctor for medical advice about side effects. You may report side effects to FDA at 8-439-IMU-5602.  Where should I keep my medicine?  Keep out of the reach of children.  Store at room temperature between 15 and 30 degrees C (59 and 86 degrees F). Keep container tightly closed. Throw away any unused medicine after the expiration date.  NOTE: This sheet is a summary. It may not cover all possible information. If you have questions about this medicine, talk to your doctor, pharmacist, or health care provider.     © 2016, Elsevier/Gold Standard. (2014 10:48:02)          Pelvic Rest  Pelvic rest is sometimes recommended for women when:   · The placenta is partially or completely covering the opening of the cervix (placenta previa).  · There is bleeding between the uterine wall and the amniotic sac in the first trimester (subchorionic hemorrhage).  · The cervix begins to open without labor starting (incompetent cervix, cervical insufficiency).  · The labor is too early ( labor).  HOME CARE INSTRUCTIONS  · Do not have sexual intercourse, stimulation, or an orgasm.  · Do not use tampons, douche, or put anything in the vagina.  · Do not lift anything over 10 pounds (4.5 kg).  · Avoid strenuous activity or straining your pelvic muscles.  SEEK MEDICAL CARE IF:   · You have any vaginal bleeding during pregnancy. Treat this as a  potential emergency.  · You have cramping pain felt low in the stomach (stronger than menstrual cramps).  · You notice vaginal discharge (watery, mucus, or bloody).  · You have a low, dull backache.  · There are regular contractions or uterine tightening.  SEEK IMMEDIATE MEDICAL CARE IF:  You have vaginal bleeding and have placenta previa.      This information is not intended to replace advice given to you by your health care provider. Make sure you discuss any questions you have with your health care provider.     Document Released: 2012 Document Revised: 2013 Document Reviewed: 2016  [employer] Interactive Patient Education ©6 [employer].           Delivery, Care After  Refer to this sheet in the next few weeks. These instructions provide you with information on caring for yourself after your procedure. Your health care provider may also give you specific instructions. Your treatment has been planned according to current medical practices, but problems sometimes occur. Call your health care provider if you have any problems or questions after you go home.  HOME CARE INSTRUCTIONS   · Only take over-the-counter or prescription medications as directed by your health care provider.  · Do not drink alcohol, especially if you are breastfeeding or taking medication to relieve pain.  · Do not chew or smoke tobacco.  · Continue to use good perineal care. Good perineal care includes:    Wiping your perineum from front to back.    Keeping your perineum clean.  · Check your surgical cut (incision) daily for increased redness, drainage, swelling, or separation of skin.  · Clean your incision gently with soap and water every day, and then pat it dry. If your health care provider says it is okay, leave the incision uncovered. Use a bandage (dressing) if the incision is draining fluid or appears irritated. If the adhesive strips across the incision do not fall off within 7 days, carefully peel them  off.  · Hug a pillow when coughing or sneezing until your incision is healed. This helps to relieve pain.  · Do not use tampons or douche until your health care provider says it is okay.  · Shower, wash your hair, and take tub baths as directed by your health care provider.  · Wear a well-fitting bra that provides breast support.  · Limit wearing support panties or control-top hose.  · Drink enough fluids to keep your urine clear or pale yellow.  · Eat high-fiber foods such as whole grain cereals and breads, brown rice, beans, and fresh fruits and vegetables every day. These foods may help prevent or relieve constipation.  · Resume activities such as climbing stairs, driving, lifting, exercising, or traveling as directed by your health care provider.  · Talk to your health care provider about resuming sexual activities. This is dependent upon your risk of infection, your rate of healing, and your comfort and desire to resume sexual activity.  · Try to have someone help you with your household activities and your  for at least a few days after you leave the hospital.  · Rest as much as possible. Try to rest or take a nap when your  is sleeping.  · Increase your activities gradually.  · Keep all of your scheduled postpartum appointments. It is very important to keep your scheduled follow-up appointments. At these appointments, your health care provider will be checking to make sure that you are healing physically and emotionally.  SEEK MEDICAL CARE IF:   · You are passing large clots from your vagina. Save any clots to show your health care provider.  · You have a foul smelling discharge from your vagina.  · You have trouble urinating.  · You are urinating frequently.  · You have pain when you urinate.  · You have a change in your bowel movements.  · You have increasing redness, pain, or swelling near your incision.  · You have pus draining from your incision.  · Your incision is .  · You have  painful, hard, or reddened breasts.  · You have a severe headache.  · You have blurred vision or see spots.  · You feel sad or depressed.  · You have thoughts of hurting yourself or your .  · You have questions about your care, the care of your , or medications.  · You are dizzy or light-headed.  · You have a rash.  · You have pain, redness, or swelling at the site of the removed intravenous access (IV) tube.  · You have nausea or vomiting.  · You stopped breastfeeding and have not had a menstrual period within 12 weeks of stopping.  · You are not breastfeeding and have not had a menstrual period within 12 weeks of delivery.  · You have a fever.  SEEK IMMEDIATE MEDICAL CARE IF:  · You have persistent pain.  · You have chest pain.  · You have shortness of breath.  · You faint.  · You have leg pain.  · You have stomach pain.  · Your vaginal bleeding saturates 2 or more sanitary pads in 1 hour.  MAKE SURE YOU:   · Understand these instructions.  · Will watch your condition.  · Will get help right away if you are not doing well or get worse.     This information is not intended to replace advice given to you by your health care provider. Make sure you discuss any questions you have with your health care provider.     Document Released: 2003 Document Revised: 2016 Document Reviewed: 2013  [employer] Interactive Patient Education ©6 [employer]         PREVENTING SURGICAL SITE INFECTIONS     Surgical Site Infections FAQs  What is a Surgical Site Infection (SSI)?  A surgical site infection is an infection that occurs after surgery in the part of the body where the surgery took place. Most patients who have surgery do not develop an infection. However, infections develop in about 1 to 3 out of every 100 patients who have surgery.  Some of the common symptoms of a surgical site infection are:  · Redness and pain around the area where you had surgery  · Drainage of cloudy fluid from your  surgical wound  · Fever  Can SSIs be treated?  Yes. Most surgical site infections can be treated with antibiotics. The antibiotic given to you depends on the bacteria (germs) causing the infection. Sometimes patients with SSIs also need another surgery to treat the infection.  What are some of the things that hospitals are doing to prevent SSIs?  To prevent SSIs, doctors, nurses, and other healthcare providers:  · Clean their hands and arms up to their elbows with an antiseptic agent just before the surgery.  · Clean their hands with soap and water or an alcohol-based hand rub before and after caring for each patient.  · May remove some of your hair immediately before your surgery using electric clippers if the hair is in the same area where the procedure will occur. They should not shave you with a razor.  · Wear special hair covers, masks, gowns, and gloves during surgery to keep the surgery area clean.  · Give you antibiotics before your surgery starts. In most cases, you should get antibiotics within 60 minutes before the surgery starts and the antibiotics should be stopped within 24 hours after surgery.  · Clean the skin at the site of your surgery with a special soap that kills germs.  What can I do to help prevent SSIs?  Before your surgery:  · Tell your doctor about other medical problems you may have. Health problems such as allergies, diabetes, and obesity could affect your surgery and your treatment.  · Quit smoking. Patients who smoke get more infections. Talk to your doctor about how you can quit before your surgery.  · Do not shave near where you will have surgery. Shaving with a razor can irritate your skin and make it easier to develop an infection.  At the time of your surgery:  · Speak up if someone tries to shave you with a razor before surgery. Ask why you need to be shaved and talk with your surgeon if you have any concerns.  · Ask if you will get antibiotics before surgery.  After your  surgery:  · Make sure that your healthcare providers clean their hands before examining you, either with soap and water or an alcohol-based hand rub.    If you do not see your providers clean their hands, please ask them to do so.  · Family and friends who visit you should not touch the surgical wound or dressings.  · Family and friends should clean their hands with soap and water or an alcohol-based hand rub before and after visiting you. If you do not see them clean their hands, ask them to clean their hands.  What do I need to do when I go home from the hospital?  · Before you go home, your doctor or nurse should explain everything you need to know about taking care of your wound. Make sure you understand how to care for your wound before you leave the hospital.  · Always clean your hands before and after caring for your wound.  · Before you go home, make sure you know who to contact if you have questions or problems after you get home.  · If you have any symptoms of an infection, such as redness and pain at the surgery site, drainage, or fever, call your doctor immediately.  If you have additional questions, please ask your doctor or nurse.  Developed and co-sponsored by The Society for Healthcare Epidemiology of [place] (SHEA); Infectious Diseases Society of [place] (IDSA); American Hospital Association; Association for Professionals in Infection Control and Epidemiology (APIC); Centers for Disease Control and Prevention (CDC); and The Joint Commission.     This information is not intended to replace advice given to you by your health care provider. Make sure you discuss any questions you have with your health care provider.     Document Released: 12/23/2014 Document Revised: 01/08/2016 Document Reviewed: 03/02/2016  [employer] Interactive Patient Education ©2016 [employer]             SYMPTOMS OF A STROKE    Call 911 or have someone take you to the Emergency Department if you have any of the  following:    · Sudden numbness or weakness of your face, arm or leg especially on one side of the body  · Sudden confusion, diffiiculty speaking or trouble understanding   · Changes in your vision or loss of sight in one eye  · Sudden severe headache with no known cause  · sudden dizziness, trouble walking, loss of balance or coordination    It is important to seek emergency care right away if you have further stroke symptoms. If you get emergency help quickly, the powerful clot-dissolving medicines can reduce the disabilities caused by a stroke.     For more information:    American Stroke Association  9-058-2-STROKE  www.strokeassociation.org           IF YOU SMOKE OR USE TOBACCO PLEASE READ THE FOLLOWING:    Why is smoking bad for me?  Smoking increases the risk of heart disease, lung disease, vascular disease, stroke, and cancer.     If you smoke, STOP!    If you would like more information on quitting smoking, please visit the [employer] website: [email or web address]   This link will provide additional resources including the QUIT line and the Beat the Pack support groups.     For more information:    American Cancer Society  [phone] [phone]    American Heart Association  1-192.325.1345               YOU ARE THE MOST IMPORTANT FACTOR IN YOUR RECOVERY.     Follow all instructions carefully.     I have reviewed my discharge instructions with my nurse, including the following information, if applicable:     Information about my illness and diagnosis   Follow up appointments (including lab draws)   Wound Care   Equipment Needs   Medications (new and continuing) along with side effects   Preventative information such as vaccines and smoking cessations   Diet   Pain   I know when to contact my Doctor's office or seek emergency care      I want my nurse to describe the side effects of my medications: YES NO   If the answer is no, I understand the side effects of my  medications: YES NO   My nurse described the side effects of my medications in a way that I could understand: YES NO   I have taken my personal belongings and my own medications with me at discharge: YES NO            I have received this information and my questions have been answered. I have discussed any concerns I see with this plan with the nurse or physician. I understand these instructions.    Signature of Patient or Responsible Person: _____________________________________    Date: _________________  Time: __________________    Signature of Healthcare Provider: _______________________________________  Date: _________________  Time: __________________

## 2017-01-17 PROCEDURE — 59515 CESAREAN DELIVERY: CPT | Performed by: OBSTETRICS & GYNECOLOGY

## 2017-01-17 PROCEDURE — 88307 TISSUE EXAM BY PATHOLOGIST: CPT | Performed by: OBSTETRICS & GYNECOLOGY

## 2017-01-17 PROCEDURE — 25010000002 BUTORPHANOL PER 1 MG: Performed by: NURSE ANESTHETIST, CERTIFIED REGISTERED

## 2017-01-17 PROCEDURE — 25010000002 ONDANSETRON PER 1 MG: Performed by: NURSE ANESTHETIST, CERTIFIED REGISTERED

## 2017-01-17 PROCEDURE — 94799 UNLISTED PULMONARY SVC/PX: CPT

## 2017-01-17 PROCEDURE — 25010000002 KETOROLAC TROMETHAMINE PER 15 MG: Performed by: NURSE ANESTHETIST, CERTIFIED REGISTERED

## 2017-01-17 PROCEDURE — 25010000002 HYDROMORPHONE PER 4 MG: Performed by: NURSE ANESTHETIST, CERTIFIED REGISTERED

## 2017-01-17 RX ORDER — IBUPROFEN 800 MG/1
800 TABLET ORAL EVERY 8 HOURS PRN
Status: DISCONTINUED | OUTPATIENT
Start: 2017-01-17 | End: 2017-01-20 | Stop reason: HOSPADM

## 2017-01-17 RX ORDER — HYDROCODONE BITARTRATE AND ACETAMINOPHEN 5; 325 MG/1; MG/1
1 TABLET ORAL EVERY 4 HOURS PRN
Status: DISCONTINUED | OUTPATIENT
Start: 2017-01-17 | End: 2017-01-17 | Stop reason: HOSPADM

## 2017-01-17 RX ORDER — OXYCODONE AND ACETAMINOPHEN 7.5; 325 MG/1; MG/1
2 TABLET ORAL EVERY 4 HOURS PRN
Status: DISPENSED | OUTPATIENT
Start: 2017-01-17 | End: 2017-01-18

## 2017-01-17 RX ORDER — PROMETHAZINE HYDROCHLORIDE 25 MG/ML
12.5 INJECTION, SOLUTION INTRAMUSCULAR; INTRAVENOUS EVERY 6 HOURS PRN
Status: DISCONTINUED | OUTPATIENT
Start: 2017-01-17 | End: 2017-01-17 | Stop reason: HOSPADM

## 2017-01-17 RX ORDER — BUTORPHANOL TARTRATE 1 MG/ML
1 INJECTION, SOLUTION INTRAMUSCULAR; INTRAVENOUS
Status: DISCONTINUED | OUTPATIENT
Start: 2017-01-17 | End: 2017-01-17 | Stop reason: HOSPADM

## 2017-01-17 RX ORDER — NALOXONE HCL 0.4 MG/ML
0.4 VIAL (ML) INJECTION
Status: ACTIVE | OUTPATIENT
Start: 2017-01-17 | End: 2017-01-18

## 2017-01-17 RX ORDER — PROMETHAZINE HYDROCHLORIDE 25 MG/ML
12.5 INJECTION, SOLUTION INTRAMUSCULAR; INTRAVENOUS EVERY 6 HOURS PRN
Status: DISCONTINUED | OUTPATIENT
Start: 2017-01-17 | End: 2017-01-20 | Stop reason: HOSPADM

## 2017-01-17 RX ORDER — OXYTOCIN/RINGER'S LACTATE 20/1000 ML
999 PLASTIC BAG, INJECTION (ML) INTRAVENOUS CONTINUOUS
Status: ACTIVE | OUTPATIENT
Start: 2017-01-17 | End: 2017-01-17

## 2017-01-17 RX ORDER — ONDANSETRON 2 MG/ML
INJECTION INTRAMUSCULAR; INTRAVENOUS AS NEEDED
Status: DISCONTINUED | OUTPATIENT
Start: 2017-01-17 | End: 2017-01-17 | Stop reason: SURG

## 2017-01-17 RX ORDER — OXYCODONE AND ACETAMINOPHEN 7.5; 325 MG/1; MG/1
1 TABLET ORAL EVERY 4 HOURS PRN
Status: DISCONTINUED | OUTPATIENT
Start: 2017-01-18 | End: 2017-01-20 | Stop reason: HOSPADM

## 2017-01-17 RX ORDER — DOCUSATE SODIUM 100 MG/1
100 CAPSULE, LIQUID FILLED ORAL 2 TIMES DAILY PRN
Status: DISCONTINUED | OUTPATIENT
Start: 2017-01-17 | End: 2017-01-20 | Stop reason: HOSPADM

## 2017-01-17 RX ORDER — OXYCODONE AND ACETAMINOPHEN 7.5; 325 MG/1; MG/1
1 TABLET ORAL EVERY 4 HOURS PRN
Status: DISPENSED | OUTPATIENT
Start: 2017-01-17 | End: 2017-01-18

## 2017-01-17 RX ORDER — OXYCODONE AND ACETAMINOPHEN 10; 325 MG/1; MG/1
1 TABLET ORAL EVERY 4 HOURS PRN
Status: DISCONTINUED | OUTPATIENT
Start: 2017-01-18 | End: 2017-01-20 | Stop reason: HOSPADM

## 2017-01-17 RX ORDER — ONDANSETRON 2 MG/ML
4 INJECTION INTRAMUSCULAR; INTRAVENOUS EVERY 6 HOURS PRN
Status: DISCONTINUED | OUTPATIENT
Start: 2017-01-17 | End: 2017-01-20 | Stop reason: HOSPADM

## 2017-01-17 RX ORDER — NALBUPHINE HCL 10 MG/ML
2.5 AMPUL (ML) INJECTION EVERY 4 HOURS PRN
Status: ACTIVE | OUTPATIENT
Start: 2017-01-17 | End: 2017-01-18

## 2017-01-17 RX ORDER — DOCUSATE SODIUM 100 MG/1
100 CAPSULE, LIQUID FILLED ORAL 2 TIMES DAILY
Status: DISCONTINUED | OUTPATIENT
Start: 2017-01-17 | End: 2017-01-20 | Stop reason: HOSPADM

## 2017-01-17 RX ORDER — KETOROLAC TROMETHAMINE 30 MG/ML
INJECTION, SOLUTION INTRAMUSCULAR; INTRAVENOUS AS NEEDED
Status: DISCONTINUED | OUTPATIENT
Start: 2017-01-17 | End: 2017-01-17 | Stop reason: SURG

## 2017-01-17 RX ORDER — PROMETHAZINE HYDROCHLORIDE 12.5 MG/1
12.5 SUPPOSITORY RECTAL EVERY 6 HOURS PRN
Status: DISCONTINUED | OUTPATIENT
Start: 2017-01-17 | End: 2017-01-17 | Stop reason: HOSPADM

## 2017-01-17 RX ORDER — IBUPROFEN 800 MG/1
800 TABLET ORAL EVERY 8 HOURS PRN
Status: DISCONTINUED | OUTPATIENT
Start: 2017-01-17 | End: 2017-01-17 | Stop reason: HOSPADM

## 2017-01-17 RX ORDER — NALOXONE HCL 0.4 MG/ML
0.1 VIAL (ML) INJECTION
Status: DISCONTINUED | OUTPATIENT
Start: 2017-01-18 | End: 2017-01-20 | Stop reason: HOSPADM

## 2017-01-17 RX ORDER — BUTORPHANOL TARTRATE 1 MG/ML
0.5 INJECTION, SOLUTION INTRAMUSCULAR; INTRAVENOUS EVERY 6 HOURS PRN
Status: DISCONTINUED | OUTPATIENT
Start: 2017-01-17 | End: 2017-01-20 | Stop reason: HOSPADM

## 2017-01-17 RX ORDER — SIMETHICONE 80 MG
80 TABLET,CHEWABLE ORAL 4 TIMES DAILY PRN
Status: DISCONTINUED | OUTPATIENT
Start: 2017-01-17 | End: 2017-01-20 | Stop reason: HOSPADM

## 2017-01-17 RX ORDER — PROMETHAZINE HYDROCHLORIDE 25 MG/1
12.5 TABLET ORAL EVERY 6 HOURS PRN
Status: DISCONTINUED | OUTPATIENT
Start: 2017-01-17 | End: 2017-01-17 | Stop reason: HOSPADM

## 2017-01-17 RX ORDER — FAMOTIDINE 20 MG/1
20 TABLET, FILM COATED ORAL ONCE
Status: CANCELLED | OUTPATIENT
Start: 2017-01-16

## 2017-01-17 RX ORDER — SODIUM CHLORIDE, SODIUM LACTATE, POTASSIUM CHLORIDE, CALCIUM CHLORIDE 600; 310; 30; 20 MG/100ML; MG/100ML; MG/100ML; MG/100ML
125 INJECTION, SOLUTION INTRAVENOUS CONTINUOUS
Status: DISCONTINUED | OUTPATIENT
Start: 2017-01-17 | End: 2017-01-20 | Stop reason: HOSPADM

## 2017-01-17 RX ORDER — CARBOPROST TROMETHAMINE 250 UG/ML
250 INJECTION, SOLUTION INTRAMUSCULAR AS NEEDED
Status: DISCONTINUED | OUTPATIENT
Start: 2017-01-17 | End: 2017-01-17 | Stop reason: HOSPADM

## 2017-01-17 RX ORDER — PRENATAL VIT/IRON FUM/FOLIC AC 27MG-0.8MG
1 TABLET ORAL DAILY
Status: DISCONTINUED | OUTPATIENT
Start: 2017-01-17 | End: 2017-01-20 | Stop reason: HOSPADM

## 2017-01-17 RX ORDER — LANOLIN 100 %
OINTMENT (GRAM) TOPICAL
Status: DISCONTINUED | OUTPATIENT
Start: 2017-01-17 | End: 2017-01-20 | Stop reason: HOSPADM

## 2017-01-17 RX ORDER — ONDANSETRON 4 MG/1
4 TABLET, FILM COATED ORAL EVERY 8 HOURS PRN
Status: DISCONTINUED | OUTPATIENT
Start: 2017-01-17 | End: 2017-01-20 | Stop reason: HOSPADM

## 2017-01-17 RX ADMIN — SODIUM CHLORIDE, POTASSIUM CHLORIDE, SODIUM LACTATE AND CALCIUM CHLORIDE 125 ML/HR: 600; 310; 30; 20 INJECTION, SOLUTION INTRAVENOUS at 09:53

## 2017-01-17 RX ADMIN — IBUPROFEN 800 MG: 800 TABLET, FILM COATED ORAL at 08:18

## 2017-01-17 RX ADMIN — ONDANSETRON 4 MG: 2 INJECTION INTRAMUSCULAR; INTRAVENOUS at 00:03

## 2017-01-17 RX ADMIN — DOCUSATE SODIUM 100 MG: 100 CAPSULE ORAL at 18:22

## 2017-01-17 RX ADMIN — OXYCODONE HYDROCHLORIDE AND ACETAMINOPHEN 2 TABLET: 7.5; 325 TABLET ORAL at 18:22

## 2017-01-17 RX ADMIN — OXYCODONE HYDROCHLORIDE AND ACETAMINOPHEN 2 TABLET: 7.5; 325 TABLET ORAL at 14:37

## 2017-01-17 RX ADMIN — BUTORPHANOL TARTRATE 0.5 MG: 1 INJECTION, SOLUTION INTRAMUSCULAR; INTRAVENOUS at 05:42

## 2017-01-17 RX ADMIN — PRENATAL VIT W/ FE FUMARATE-FA TAB 27-0.8 MG 1 TABLET: 27-0.8 TAB at 09:55

## 2017-01-17 RX ADMIN — Medication 999 ML/HR: at 03:14

## 2017-01-17 RX ADMIN — KETOROLAC TROMETHAMINE 60 MG: 30 INJECTION, SOLUTION INTRAMUSCULAR at 00:13

## 2017-01-17 RX ADMIN — SODIUM CHLORIDE, POTASSIUM CHLORIDE, SODIUM LACTATE AND CALCIUM CHLORIDE 125 ML/HR: 600; 310; 30; 20 INJECTION, SOLUTION INTRAVENOUS at 01:49

## 2017-01-17 RX ADMIN — DOCUSATE SODIUM 100 MG: 100 CAPSULE ORAL at 08:17

## 2017-01-17 RX ADMIN — HYDROMORPHONE HYDROCHLORIDE 1 MG: 1 INJECTION, SOLUTION INTRAMUSCULAR; INTRAVENOUS; SUBCUTANEOUS at 00:17

## 2017-01-17 RX ADMIN — IBUPROFEN 800 MG: 800 TABLET, FILM COATED ORAL at 20:35

## 2017-01-17 RX ADMIN — OXYCODONE HYDROCHLORIDE AND ACETAMINOPHEN 1 TABLET: 7.5; 325 TABLET ORAL at 09:55

## 2017-01-17 NOTE — ANESTHESIA POSTPROCEDURE EVALUATION
Patient: Veda Fischer    Procedure Summary     Date Anesthesia Start Anesthesia Stop Room / Location    01/16/17 1852 0024 (01/17/17)        Procedure Diagnosis Scheduled Providers Provider    LABOR ANALGESIA No diagnosis on file.  Willy Alba CRNA          Anesthesia Type: epidural  Last vitals  BP      Temp      Pulse     Resp      SpO2        Post Anesthesia Care and Evaluation    Patient location during evaluation: bedside  Patient participation: complete - patient participated  Level of consciousness: awake and alert  Pain score: 0  Pain management: adequate  Airway patency: patent  Anesthetic complications: No anesthetic complications    Cardiovascular status: acceptable and stable  Respiratory status: acceptable and unassisted  Hydration status: stable  Post Neuraxial Block status: Motor and sensory function returned to baseline and No signs or symptoms of PDPH

## 2017-01-17 NOTE — PROGRESS NOTES
Nikhil Fischer  : 1990  MRN: [id number]  CSN: [id number]    Labor progress note    Subjective   She reports comfortable with epidural     Objective   Min/max vitals past 24 hours:  Temp  Min: 97.7 °F (36.5 °C)  Max: 98.8 °F (37.1 °C)   BP  Min: 97/68  Max: 151/96   Pulse  Min: 76  Max: 136   Resp  Min: 18  Max: 20        FHT's: reactive and category 1.  external monitors used   Cervix: was checked: 10 cm / 100 % / -1   Contractions: regular    Unable to palpate membranes; ? Previous rupture although not documented.  Assessment   1. IUP at 39w5d  2. GBS +; on PCN G  3. FSR     Plan   1.   Begin to push  Allow labor to continue pending maternal and fetal status  Plan discussed with family and questions answered.  Understanding verbalized.    Phill Ashley MD  2017  8:28 PM

## 2017-01-17 NOTE — ANESTHESIA POSTPROCEDURE EVALUATION
Patient: Veda Fischer    Procedure Summary     Date Anesthesia Start Anesthesia Stop Room / Location    01/16/17 1852 0024 (01/17/17)        Procedure Diagnosis Scheduled Providers Provider    LABOR ANALGESIA No diagnosis on file.  Willy Alba CRNA          Anesthesia Type: epidural  Last vitals  BP      Temp      Pulse     Resp      SpO2        Post Anesthesia Care and Evaluation    Patient location during evaluation: bedside  Patient participation: complete - patient participated  Level of consciousness: awake and alert  Pain score: 0  Pain management: adequate  Airway patency: patent  Anesthetic complications: No anesthetic complications    Cardiovascular status: acceptable and stable  Respiratory status: acceptable and unassisted  Hydration status: stable

## 2017-01-17 NOTE — ANESTHESIA PROCEDURE NOTES
Labor Epidural    Patient location during procedure: OB  Start Time: 1/16/2017 6:58 PM  Stop Time: 1/16/2017 7:03 PM  Indication:at surgeon's request  Performed By  CRNA: JANA JACKSON  Preanesthetic Checklist  Completed: patient identified, site marked, surgical consent, pre-op evaluation, timeout performed, IV checked, risks and benefits discussed and monitors and equipment checked  Epidural Block Prep:  Pt Position:sitting  Sterile Tech:cap, gloves, mask and sterile barrier  Monitoring:blood pressure monitoring and continuous pulse oximetry  Epidural Block Procedure:  Approach:midline  Location:L4-L5  Needle Type:Tuohy  Needle Gauge:17 G  Loss of Resistance: 10 and 9cm  Cath Depth at skin:15 cm  Paresthesia: none  Aspiration:negative  Test Dose:negative  Post Assessment:  Dressing:biopatch applied, secured with tape and occlusive dressing applied  Pt Tolerance:patient tolerated the procedure well with no apparent complications  Complications:no

## 2017-01-17 NOTE — ANESTHESIA PREPROCEDURE EVALUATION
Anesthesia Evaluation      Airway   Mallampati: II  TM distance: >3 FB  Neck ROM: full  no difficulty expected  Dental - normal exam     Pulmonary    (+) asthma,   Cardiovascular - negative cardio ROS    Neuro/Psych  (+) headaches,    GI/Hepatic/Renal/Endo    (+) morbid obesity,     Musculoskeletal (-) negative ROS    Abdominal    Substance History - negative use     OB/GYN    (+) Pregnant,         Other - negative ROS                          Anesthesia Plan    ASA 2     epidural     Anesthetic plan and risks discussed with patient and spouse/significant other.    Plan discussed with CRNA.

## 2017-01-17 NOTE — OP NOTE
Nikhil Fischer  : 1990  MRN: [id number]  CSN: [id number]     Section Operative Not    Pre-Operative Dx:   1. IUP at 39w6d weeks   2. cephalo-pelvic disproportion and failure to progress: arrest of descent      Postoperative dx:    1. Same as above     Procedure: Primary  (LTCS)       Surgeon: Phill Ashley MD           Anesthesia: Epidural        EBL: 1,000 mls.   IV Fluids: unknown mls.   UOP: Clear, unknown volume mls.     Antibiotics: cefazolin 2 gms     Infant:           Gender: male  infant    Weight: No birth weight on file.     Apgars:    @ 1 minute /    @ 5 minutes    Cord gases: Venous:  @BABYNOHDR(BRIEFLAB, PHCVEN, BECVEN)@     Arterial:  @BABYNOHDR(BRIEFLAB, PHCART, BECART)@     Procedure Details:   The patient was taken to the OR where she was prepped and prepped and draped in the usual sterile fashion in the dorsal supine position with a leftward lateral tilt.  A Pfannenstiel incision was created sharply with the knife. That incision was carried through the underlying layers of fascia sharply.  The fascia was incised in the midline with the scalpel and this incision further developed using the fascial rip technique. The fascia was freed from its midline insertion superiorly and inferiorly. Rectus muscles were  in the midline. The peritoneum was entered and bluntly, and the peritoneal window further developed using blunt stretching.  A bladder flap was created sharply. The lower uterine segment was incised in a transverse fashion with the scalpel, and the uterine incision further developed with blunt stretching. Clear amniotic fluid was noted. The infant's head was delivered atraumatically. The mouth and nose were bulb suctioned, while the cord was being clamped and cut.  The infant was then handed off to waiting NICU staff.  The placenta was allowed to deliver spontaneously. The uterus was exteriorized and cleared of clot and debris with moist  laparotomy sponges. The uterine incision was closed with #0 monocryl in a continuous running locked fashion.  A second layer of #0 monocryl in a running fashion was used to imbricate the first.   the uterine incision was hemostatic.  The bladder flap was reapproximated.  Manpreet was placed along hysterotomy site for added hemostatic purposes.    The uterus was returned to the abdominal cavity. The paracolic gutters were cleared of clot and debris with moist laparotomy sponges. The fascia was reapproximated  with #0 vicryl in a running fashion.  Skin was closed with subcuticular absorbable staples. All counts were correct X2.         Complications:   None      Disposition:   Mother to Mother Baby/Postpartum  in stable condition currently.   Baby to NBN  in stable condition currently.       Phill Ashley MD  1/17/2017  12:12 AM

## 2017-01-17 NOTE — PLAN OF CARE
Problem: Patient Care Overview (Adult)  Goal: Plan of Care Review  Outcome: Ongoing (interventions implemented as appropriate)    01/16/17 1829   Coping/Psychosocial Response Interventions   Plan Of Care Reviewed With patient;significant other       Goal: Adult Individualization and Mutuality  Outcome: Ongoing (interventions implemented as appropriate)    01/16/17 1829   Individualization   Patient Specific Preferences only iv pain medication   Patient Specific Goals breastfeed   Patient Specific Interventions support relaxation techniques   Mutuality/Individual Preferences   What Anxieties, Fears or Concerns Do You Have About Your Health or Care? that pain will be to bad   What Questions Do You Have About Your Health or Care? how late can I get an epidural       Goal: Discharge Needs Assessment  Outcome: Ongoing (interventions implemented as appropriate)    01/16/17 1829   Discharge Needs Assessment   Concerns To Be Addressed no discharge needs identified         Problem: Labor (Cervical Ripen, Induct, Augment) (Adult,Obstetrics,Pediatric)  Goal: Signs and Symptoms of Listed Potential Problems Will be Absent or Manageable (Labor)  Outcome: Ongoing (interventions implemented as appropriate)    01/16/17 1829   Labor (Cervical Ripen, Induct, Augment)   Problems Assessed (Labor) all   Problems Present (Labor) pain

## 2017-01-17 NOTE — PROGRESS NOTES
[facility]   PROGRESS NOTE    Post-Op Day 1 S/P   Subjective     Patient reports:  Pain is well controlled with spinal analgesia, she has taken no PO meds.  She is not ambulating without assistance. Not Tolerating regular diet, but has not tried since she just delivered late last night.  Voiding - still with dunham; No bowel movement reported.  Vaginal bleeding is as much as expected.     Breastfeeding: pumping without difficulty  Objective      Vitals: Vital Signs Range for the last 24 hours  Temperature: Temp:  [97.7 °F (36.5 °C)-99.1 °F (37.3 °C)] 97.8 °F (36.6 °C)   Temp Source: Temp src: Temporal Artery    BP: BP: ()/() 103/63   Pulse: Heart Rate:  [] 118   Respirations: Resp:  [18-20] 18   SPO2: SpO2:  [96 %-100 %] 97 %   O2 Amount (l/min):     O2 Devices O2 Device: room air   Weight: Weight:  [227 lb (103 kg)] 227 lb (103 kg)            Physical Exam    Lungs breathing is unlabored   Abdomen Abnormal shape: normal   Incision  Dressing clean   Extremities extremities normal, atraumatic, no cyanosis or edema     I reviewed the patient's new clinical results.  Lab Results (last 24 hours)     Procedure Component Value Units Date/Time    CBC (No Diff) [[id number]]  (Abnormal) Collected:  17 1256    Specimen:  Blood Updated:  17 1316     WBC 17.99 (H) 10*3/mm3      RBC 4.54 10*6/mm3      Hemoglobin 12.7 g/dL      Hematocrit 37.6 %      MCV 82.8 fL      MCH 28.0 pg      MCHC 33.8 g/dL      RDW 14.4 %      RDW-SD 42.8 fl      MPV 11.3 fL      Platelets 245 10*3/mm3     PAMG-1, Rapid Assay For ROM [[id number]]  (Normal) Collected:  17 1317    Specimen:  Amniotic Fluid Updated:  17 1336     Rupture of Membranes Negative           Assessment/Plan      Active Problems:    Normal labor      Assessment:    Veda Fischer is Day 1  post-partum  , Low Transverse   SECTION  BLOOD LOSS 1000ML .    breastfeed     Plan:  remove dressing, remove urine  catheter and continue post op care.  Patient without questions or concerns.        Anne Castro MD  1/17/2017  6:46 AM

## 2017-01-17 NOTE — PROGRESS NOTES
Nikhil Fsicher  : 1990  MRN: [id number]  CSN: [id number]    Labor progress note    Subjective   She reports comfortable with epidural     Objective   Min/max vitals past 24 hours:  Temp  Min: 97.7 °F (36.5 °C)  Max: 98.8 °F (37.1 °C)   BP  Min: 94/73  Max: 151/96   Pulse  Min: 73  Max: 154   Resp  Min: 18  Max: 20        FHT's: reactive and category 1.  external monitors used   Cervix: was checked: 10 cm / 100 % / -1   Contractions: regular    Has been C/C for 3 hours without significant change in fetal station.  Diagnosis of CPD explained in detail.  Risks and benefits and indication for C Section discussed at length.  Questions answered.  Agrees to proceed.  Assessment   1. IUP at 39w5d  2. FSR  3. CPD     Plan   1.    section  Plan discussed with family and questions answered.  Understanding verbalized.    Phill Ashley MD  2017  11:24 PM

## 2017-01-17 NOTE — PLAN OF CARE
Problem: Patient Care Overview (Adult)  Goal: Plan of Care Review  Outcome: Ongoing (interventions implemented as appropriate)    17 0810   Coping/Psychosocial Response Interventions   Plan Of Care Reviewed With patient;significant other   Patient Care Overview   Progress progress toward functional goals as expected   Outcome Evaluation   Outcome Summary/Follow up Plan FF ML UU. Sseveral large clots noted. Fundal massage until firm and no trickling. Pitocin rate upped to 200ml/hour. Dilaudid added to epidural. Continuous monitoring of pulse ox. Pumping and storing breast milk for now, until UDS come back for baby. Mom admitted to marijuana use 5 months ago. Taught about breast pump. Catheter still in place. Output improving. Stadol given for itching at 0545. Wishes to breastfeed as soon as possible.        Goal: Adult Individualization and Mutuality  Outcome: Ongoing (interventions implemented as appropriate)  Goal: Discharge Needs Assessment  Outcome: Ongoing (interventions implemented as appropriate)    Problem: Labor (Cervical Ripen, Induct, Augment) (Adult,Obstetrics,Pediatric)  Goal: Signs and Symptoms of Listed Potential Problems Will be Absent or Manageable (Labor)  Outcome: Ongoing (interventions implemented as appropriate)    Problem: Postpartum ( Delivery) (Adult)  Goal: Signs and Symptoms of Listed Potential Problems Will be Absent or Manageable (Postpartum)  Outcome: Ongoing (interventions implemented as appropriate)

## 2017-01-17 NOTE — PLAN OF CARE
Problem: Postpartum ( Delivery) (Adult)  Goal: Signs and Symptoms of Listed Potential Problems Will be Absent or Manageable (Postpartum)  Outcome: Ongoing (interventions implemented as appropriate)

## 2017-01-18 LAB
BASOPHILS # BLD AUTO: 0.02 10*3/MM3 (ref 0–0.2)
BASOPHILS NFR BLD AUTO: 0.2 % (ref 0–2)
CYTO UR: NORMAL
DEPRECATED RDW RBC AUTO: 45.1 FL (ref 40–54)
DEPRECATED RDW RBC AUTO: 46 FL (ref 40–54)
EOSINOPHIL # BLD AUTO: 0.16 10*3/MM3 (ref 0–0.7)
EOSINOPHIL NFR BLD AUTO: 1.3 % (ref 0–4)
ERYTHROCYTE [DISTWIDTH] IN BLOOD BY AUTOMATED COUNT: 14.9 % (ref 12–15)
ERYTHROCYTE [DISTWIDTH] IN BLOOD BY AUTOMATED COUNT: 15.2 % (ref 12–15)
HCT VFR BLD AUTO: 19.7 % (ref 37–47)
HCT VFR BLD AUTO: 25.2 % (ref 37–47)
HGB BLD-MCNC: 6.6 G/DL (ref 12–16)
HGB BLD-MCNC: 8.3 G/DL (ref 12–16)
IMM GRANULOCYTES # BLD: 0.19 10*3/MM3 (ref 0–0.03)
IMM GRANULOCYTES NFR BLD: 1.5 % (ref 0–5)
LAB AP CASE REPORT: NORMAL
LAB AP CLINICAL INFORMATION: NORMAL
LYMPHOCYTES # BLD AUTO: 2.41 10*3/MM3 (ref 0.72–4.86)
LYMPHOCYTES NFR BLD AUTO: 19.4 % (ref 15–45)
Lab: NORMAL
MCH RBC QN AUTO: 27.9 PG (ref 28–32)
MCH RBC QN AUTO: 28.2 PG (ref 28–32)
MCHC RBC AUTO-ENTMCNC: 32.9 G/DL (ref 33–36)
MCHC RBC AUTO-ENTMCNC: 33.5 G/DL (ref 33–36)
MCV RBC AUTO: 84.2 FL (ref 82–98)
MCV RBC AUTO: 84.6 FL (ref 82–98)
MONOCYTES # BLD AUTO: 0.86 10*3/MM3 (ref 0.19–1.3)
MONOCYTES NFR BLD AUTO: 6.9 % (ref 4–12)
NEUTROPHILS # BLD AUTO: 8.8 10*3/MM3 (ref 1.87–8.4)
NEUTROPHILS NFR BLD AUTO: 70.7 % (ref 39–78)
NRBC BLD MANUAL-RTO: 0 /100 WBC (ref 0–0)
PATH REPORT.FINAL DX SPEC: NORMAL
PATH REPORT.GROSS SPEC: NORMAL
PLATELET # BLD AUTO: 187 10*3/MM3 (ref 130–400)
PLATELET # BLD AUTO: 230 10*3/MM3 (ref 130–400)
PMV BLD AUTO: 10.2 FL (ref 6–12)
PMV BLD AUTO: 10.4 FL (ref 6–12)
RBC # BLD AUTO: 2.34 10*6/MM3 (ref 4.2–5.4)
RBC # BLD AUTO: 2.98 10*6/MM3 (ref 4.2–5.4)
WBC NRBC COR # BLD: 12.44 10*3/MM3 (ref 4.8–10.8)
WBC NRBC COR # BLD: 13.65 10*3/MM3 (ref 4.8–10.8)

## 2017-01-18 PROCEDURE — 63710000001 DIPHENHYDRAMINE PER 50 MG: Performed by: OBSTETRICS & GYNECOLOGY

## 2017-01-18 PROCEDURE — 86900 BLOOD TYPING SEROLOGIC ABO: CPT

## 2017-01-18 PROCEDURE — 36430 TRANSFUSION BLD/BLD COMPNT: CPT

## 2017-01-18 PROCEDURE — 85027 COMPLETE CBC AUTOMATED: CPT | Performed by: OBSTETRICS & GYNECOLOGY

## 2017-01-18 PROCEDURE — P9016 RBC LEUKOCYTES REDUCED: HCPCS

## 2017-01-18 PROCEDURE — 85025 COMPLETE CBC W/AUTO DIFF WBC: CPT | Performed by: OBSTETRICS & GYNECOLOGY

## 2017-01-18 PROCEDURE — 30233N1 TRANSFUSION OF NONAUTOLOGOUS RED BLOOD CELLS INTO PERIPHERAL VEIN, PERCUTANEOUS APPROACH: ICD-10-PCS | Performed by: OBSTETRICS & GYNECOLOGY

## 2017-01-18 RX ORDER — DIPHENHYDRAMINE HCL 25 MG
25 CAPSULE ORAL ONCE
Status: COMPLETED | OUTPATIENT
Start: 2017-01-18 | End: 2017-01-18

## 2017-01-18 RX ORDER — METHYLERGONOVINE MALEATE 0.2 MG/1
200 TABLET ORAL EVERY 6 HOURS SCHEDULED
Status: COMPLETED | OUTPATIENT
Start: 2017-01-18 | End: 2017-01-19

## 2017-01-18 RX ADMIN — OXYCODONE HYDROCHLORIDE AND ACETAMINOPHEN 1 TABLET: 10; 325 TABLET ORAL at 16:07

## 2017-01-18 RX ADMIN — DOCUSATE SODIUM 100 MG: 100 CAPSULE ORAL at 08:20

## 2017-01-18 RX ADMIN — IBUPROFEN 800 MG: 800 TABLET, FILM COATED ORAL at 19:59

## 2017-01-18 RX ADMIN — METHYLERGONOVINE MALEATE 200 MCG: 0.2 TABLET ORAL at 19:16

## 2017-01-18 RX ADMIN — IBUPROFEN 800 MG: 800 TABLET, FILM COATED ORAL at 04:30

## 2017-01-18 RX ADMIN — OXYCODONE HYDROCHLORIDE AND ACETAMINOPHEN 1 TABLET: 7.5; 325 TABLET ORAL at 04:31

## 2017-01-18 RX ADMIN — DIPHENHYDRAMINE HYDROCHLORIDE 25 MG: 25 CAPSULE ORAL at 05:56

## 2017-01-18 RX ADMIN — OXYCODONE HYDROCHLORIDE AND ACETAMINOPHEN 1 TABLET: 10; 325 TABLET ORAL at 19:59

## 2017-01-18 RX ADMIN — OXYCODONE HYDROCHLORIDE AND ACETAMINOPHEN 1 TABLET: 7.5; 325 TABLET ORAL at 09:22

## 2017-01-18 RX ADMIN — PRENATAL VIT W/ FE FUMARATE-FA TAB 27-0.8 MG 1 TABLET: 27-0.8 TAB at 08:20

## 2017-01-18 RX ADMIN — DOCUSATE SODIUM 100 MG: 100 CAPSULE ORAL at 19:16

## 2017-01-18 NOTE — LACTATION NOTE
This note was copied from a baby's chart.  Lactation visit. Mom states [employer] called and her pump is ready for pickup. Dad will go to get pump later today. Mom states she is about to breastfeed infant. Latch assistance provided. Encouraged mom that baby is latching well with shield and to continue to feed every 3 hours. Colostrum seen in shield. Lactation contact information provided on white board.

## 2017-01-18 NOTE — LACTATION NOTE
"This note was copied from a baby's chart.   39 5/7 weeks infant born 17 @ 2353, \"Raiden\" Mom desired to breastfeed, tested positive for THC on admit, Pediatrician ordered infant to formula feed pending urine drug screen, Mom pumping and storing EBM until infants lab results are in. Infants urine drug screen is negative, RN received orders for infant to go to breast. Education completed, see education.  Assessed with feeding, mom has flat nipples will protrude with stimulation, nursing without nipple shield, difficulty getting mom in comfortable position.    No Breast pump for home use, Mom requested RX be faxed to [facility].    Maternal HX: Migraines, Asthma, THC  Maternal Prenatal Medications: PNV        1830 RN requested assistance with latch, obtained successful latch on right for 15 minutes, left 5 minutes, infant frustrated at breast, pulling away, crying. Mom supplemented with 1.5 ml if EBM and formula. Washed breast pump parts left to air dry, encouraged mom to pump after supplementation. Offered mom support  "

## 2017-01-18 NOTE — PROGRESS NOTES
[facility]   PROGRESS NOTE    Post-Op Day 2 S/P   Subjective   Subjective  Patient reports:  Pain is well controlled with narcotic analgesics including Percocet.  She is  ambulating. Tolerating diet. Tolerating po -- normal.  Intake -- c/o of tolerating po solids.   Voiding - without difficulty; flatus reported..  Vaginal bleeding is as much as expected.    Objective    Objective     Vitals: Vital Signs Range for the last 24 hours  Temperature: Temp:  [97.5 °F (36.4 °C)-98.5 °F (36.9 °C)] 98 °F (36.7 °C)   Temp Source: Temp src: Temporal Artery    BP: BP: (101-136)/(43-77) 109/43   Pulse: Heart Rate:  [] 93   Respirations: Resp:  [18] 18   SPO2: SpO2:  [97 %-99 %] 98 %   O2 Amount (l/min):     O2 Devices O2 Device: room air   Weight:              Physical Exam    Lungs clear to auscultation bilaterally   Abdomen Soft, non-tender, normal bowel sounds; no bruits, organomegaly or masses.   Incision  healing well   Extremities extremities normal, atraumatic, no cyanosis or edema     I reviewed the patient's new clinical results.    Assessment/Plan        Assessment & Plan    Assessment:    Veda Fischer is Day 2  post-partum  , Low Transverse   SECTION  BLOOD LOSS 1000ML .      Plan:  plan for discharge tomorrow.      Shania Gonsales MD  17  8:09 AM

## 2017-01-18 NOTE — PLAN OF CARE
Problem: Patient Care Overview (Adult)  Goal: Plan of Care Review  Outcome: Ongoing (interventions implemented as appropriate)    17 0337   Coping/Psychosocial Response Interventions   Plan Of Care Reviewed With patient;significant other   Patient Care Overview   Progress improving   Outcome Evaluation   Outcome Summary/Follow up Plan Pain at tolerable level with PO motrin. Voiding appropriately. FF 1U ML. VSS. Breastfeeding, supplementing, and pumping.        Goal: Adult Individualization and Mutuality  Outcome: Ongoing (interventions implemented as appropriate)  Goal: Discharge Needs Assessment  Outcome: Ongoing (interventions implemented as appropriate)    Problem: Postpartum ( Delivery) (Adult)  Goal: Signs and Symptoms of Listed Potential Problems Will be Absent or Manageable (Postpartum)  Outcome: Ongoing (interventions implemented as appropriate)

## 2017-01-19 LAB
ABO + RH BLD: NORMAL
BH BB BLOOD EXPIRATION DATE: NORMAL
BH BB BLOOD TYPE BARCODE: 5100
BH BB DISPENSE STATUS: NORMAL
BH BB PRODUCT CODE: NORMAL
BH BB UNIT NUMBER: NORMAL
CROSSMATCH INTERPRETATION: NORMAL
DEPRECATED RDW RBC AUTO: 44.7 FL (ref 40–54)
EOSINOPHIL # BLD MANUAL: 0.57 10*3/MM3 (ref 0–0.7)
EOSINOPHIL NFR BLD MANUAL: 4 % (ref 0–4)
ERYTHROCYTE [DISTWIDTH] IN BLOOD BY AUTOMATED COUNT: 15.2 % (ref 12–15)
HCT VFR BLD AUTO: 27 % (ref 37–47)
HGB BLD-MCNC: 9.1 G/DL (ref 12–16)
LYMPHOCYTES # BLD MANUAL: 3.53 10*3/MM3 (ref 0.72–4.86)
LYMPHOCYTES NFR BLD MANUAL: 25 % (ref 15–45)
LYMPHOCYTES NFR BLD MANUAL: 4 % (ref 4–12)
MCH RBC QN AUTO: 28.1 PG (ref 28–32)
MCHC RBC AUTO-ENTMCNC: 33.7 G/DL (ref 33–36)
MCV RBC AUTO: 83.3 FL (ref 82–98)
MONOCYTES # BLD AUTO: 0.57 10*3/MM3 (ref 0.19–1.3)
NEUTROPHILS # BLD AUTO: 9.04 10*3/MM3 (ref 1.87–8.4)
NEUTROPHILS NFR BLD MANUAL: 61 % (ref 39–78)
NEUTS BAND NFR BLD MANUAL: 3 % (ref 0–10)
PLAT MORPH BLD: NORMAL
PLATELET # BLD AUTO: 215 10*3/MM3 (ref 130–400)
PMV BLD AUTO: 10 FL (ref 6–12)
RBC # BLD AUTO: 3.24 10*6/MM3 (ref 4.2–5.4)
RBC MORPH BLD: NORMAL
SCAN SLIDE: NORMAL
UNIT  ABO: NORMAL
UNIT  RH: NORMAL
VARIANT LYMPHS NFR BLD MANUAL: 3 % (ref 0–5)
WBC MORPH BLD: NORMAL
WBC NRBC COR # BLD: 14.13 10*3/MM3 (ref 4.8–10.8)

## 2017-01-19 PROCEDURE — 25010000004 PNEUMOCOCCAL VAC POLYVALENT PER 0.5 ML: Performed by: OBSTETRICS & GYNECOLOGY

## 2017-01-19 PROCEDURE — 85025 COMPLETE CBC W/AUTO DIFF WBC: CPT | Performed by: OBSTETRICS & GYNECOLOGY

## 2017-01-19 PROCEDURE — 85007 BL SMEAR W/DIFF WBC COUNT: CPT | Performed by: OBSTETRICS & GYNECOLOGY

## 2017-01-19 PROCEDURE — G0009 ADMIN PNEUMOCOCCAL VACCINE: HCPCS | Performed by: OBSTETRICS & GYNECOLOGY

## 2017-01-19 PROCEDURE — 90732 PPSV23 VACC 2 YRS+ SUBQ/IM: CPT | Performed by: OBSTETRICS & GYNECOLOGY

## 2017-01-19 RX ADMIN — IBUPROFEN 800 MG: 800 TABLET, FILM COATED ORAL at 04:57

## 2017-01-19 RX ADMIN — OXYCODONE HYDROCHLORIDE AND ACETAMINOPHEN 1 TABLET: 10; 325 TABLET ORAL at 09:08

## 2017-01-19 RX ADMIN — OXYCODONE HYDROCHLORIDE AND ACETAMINOPHEN 1 TABLET: 10; 325 TABLET ORAL at 12:59

## 2017-01-19 RX ADMIN — IBUPROFEN 800 MG: 800 TABLET, FILM COATED ORAL at 22:14

## 2017-01-19 RX ADMIN — DOCUSATE SODIUM 100 MG: 100 CAPSULE ORAL at 09:08

## 2017-01-19 RX ADMIN — METHYLERGONOVINE MALEATE 200 MCG: 0.2 TABLET ORAL at 00:41

## 2017-01-19 RX ADMIN — PNEUMOCOCCAL VACCINE POLYVALENT 0.5 ML
25; 25; 25; 25; 25; 25; 25; 25; 25; 25; 25; 25; 25; 25; 25; 25; 25; 25; 25; 25; 25; 25; 25 INJECTION, SOLUTION INTRAMUSCULAR; SUBCUTANEOUS at 17:07

## 2017-01-19 RX ADMIN — METHYLERGONOVINE MALEATE 200 MCG: 0.2 TABLET ORAL at 06:59

## 2017-01-19 RX ADMIN — OXYCODONE HYDROCHLORIDE AND ACETAMINOPHEN 1 TABLET: 10; 325 TABLET ORAL at 04:57

## 2017-01-19 RX ADMIN — METHYLERGONOVINE MALEATE 200 MCG: 0.2 TABLET ORAL at 12:57

## 2017-01-19 RX ADMIN — OXYCODONE HYDROCHLORIDE AND ACETAMINOPHEN 1 TABLET: 10; 325 TABLET ORAL at 19:01

## 2017-01-19 RX ADMIN — OXYCODONE HYDROCHLORIDE AND ACETAMINOPHEN 1 TABLET: 10; 325 TABLET ORAL at 00:41

## 2017-01-19 RX ADMIN — PRENATAL VIT W/ FE FUMARATE-FA TAB 27-0.8 MG 1 TABLET: 27-0.8 TAB at 09:08

## 2017-01-19 RX ADMIN — IBUPROFEN 800 MG: 800 TABLET, FILM COATED ORAL at 12:57

## 2017-01-19 NOTE — PLAN OF CARE
Problem: Patient Care Overview (Adult)  Goal: Plan of Care Review  Outcome: Ongoing (interventions implemented as appropriate)    17 8705   Coping/Psychosocial Response Interventions   Plan Of Care Reviewed With patient;significant other   Patient Care Overview   Progress improving   Outcome Evaluation   Outcome Summary/Follow up Plan Pain at tolerable level with PO meds, voiding appropriately, FF UU ML, scant bleeding, passed large clot this afternoon. Third unit of blood running. Breastfeeding. Bonding well with baby       Goal: Adult Individualization and Mutuality  Outcome: Ongoing (interventions implemented as appropriate)  Goal: Discharge Needs Assessment  Outcome: Ongoing (interventions implemented as appropriate)    Problem: Postpartum ( Delivery) (Adult)  Goal: Signs and Symptoms of Listed Potential Problems Will be Absent or Manageable (Postpartum)  Outcome: Ongoing (interventions implemented as appropriate)

## 2017-01-19 NOTE — LACTATION NOTE
This note was copied from a baby's chart.  3 day old male infant, Magdiel, delivered via  on 17 at 39/5 weeks gestation. Infant at 6.5% weight loss. 2 voids/1 stool noted in charting. Mother states infant breast fed well through the night but has not wanted to wake and breastfeed effectively today. Demonstrated waking techniques. Assisted with latch/positioning. Infant very fussy refusing to suck more than 5-6 times and pushing away at the breast. Hand expressed several drops into infant's mouth. Infant became very sleepy after many attempts made, refusing to suck with stimulation. Assisted with pumping/massaging breast. Collected 10 mls EBM. Discussed flange size and proper fit with mother and gave 21 mm flanges for her to try with next pumping session. Discussed how anemia could effect milk supply and encouraged frequent stimulation/emptying of breasts. Feeding plan: attempt to breastfeed every 2 to 3 hours, with infant skin to skin, and breast massage/compressions. Pump after feedings and give EBM if infant does not breastfeed effectively. Discussed nutritive vs non-nutritive sucking. Encouraged dad to assist with keeping infant awake and stimulated with feedings. Offered encouragement. Instructed mother to call for any breastfeeding assistance needed. Mother verbalizes understanding, denies any other questions or concerns at this time.

## 2017-01-19 NOTE — PROGRESS NOTES
[facility]   PROGRESS NOTE    Post-Op Day 3 S/P   Subjective     Patient reports:  Pain is well controlled with ibuprofen (OTC) and Percocet.  She is ambulating. Tolerating diet. Voiding - without difficulty; flatus reported..  Vaginal bleeding is as much as expected. Received 3 units of blood yesterday. Feels well today.      Objective      Vitals: Vital Signs Range for the last 24 hours  Temperature: Temp:  [97 °F (36.1 °C)-98.3 °F (36.8 °C)] 97.3 °F (36.3 °C)   Temp Source: Temp src: Temporal Artery    BP: BP: (100-131)/(54-77) 130/71   Pulse: Heart Rate:  [] 98   Respirations: Resp:  [18] 18   SPO2: SpO2:  [97 %-100 %] 97 %   O2 Amount (l/min):     O2 Devices O2 Device: room air   Weight:              Physical Exam    Lungs clear to auscultation bilaterally   Abdomen Soft, approp tend   Incision  healing well, no erythema, no swelling   Extremities edema trace and Homans sign is negative, no sign of DVT     I reviewed the patient's new clinical results.  Lab Results (last 24 hours)     Procedure Component Value Units Date/Time    CBC (No Diff) [[id number]]  (Abnormal) Collected:  17 1622    Specimen:  Blood Updated:  17 1704     WBC 13.65 (H) 10*3/mm3      RBC 2.98 (L) 10*6/mm3      Hemoglobin 8.3 (L) g/dL      Hematocrit 25.2 (L) %      MCV 84.6 fL      MCH 27.9 (L) pg      MCHC 32.9 (L) g/dL      RDW 15.2 (H) %      RDW-SD 45.1 fl      MPV 10.2 fL      Platelets 230 10*3/mm3     Tissue Exam [[id number]] Collected:  17 0013    Specimen:  Tissue from Placenta Updated:  17 3115     Case Report --      Surgical Pathology Report                         Case: [id number]                                  Authorizing Provider:  Phill Ashley MD        Collected:           2017 12:13 AM          Ordering Location:     [facility]     Received:            2017 09:33 AM                                 LABOR DELIVERY                                         "                       Pathologist:           Aimee Alonso MD                                                         Specimen:    Placenta                                                                                    Clinical Information --      Pre-Op Diagnosis:     39 5/7 week intrauterine pregnancy.  FSR.  Post-Op Diagnosis:   No APGAR is given in chart.    Clinical History:         GBS positive.         Final Diagnosis --      Term ricci placenta:   A.    Possibly short three vessel umbilical cord (only 28.2 cm received).   B.    No evidence of acute chorioamnionitis.    C.    Patchy intervillous fibrin deposition.    D.    A few placental microcalcifications.      1       Gross Description --      Specimen #1 is received in formalin and labeled with the patient's name, medical record number, accession number and designated \"placenta\".  The specimen consists of a round/oval ricci placental disc with attached membranes and umbilical cord.  The placental disc measures 17.9 x 17.5 x 1.9 cm.  The membranes and umbilical cord are removed and the disc weighs 531 grams.  The maternal surface is red-brown and intact.  Sectioning reveals red-brown, soft and spongy parenchyma with no fibrous deposition or intraparenchymal hemorrhage identified.  The fetal surface is blue-gray with no significant discoloration.  Subchorionic fibrin comprises less than 5% of the surface.  The umbilical cord inserts eccentrically, 4.5 cm from the margin.  The cord measures 28.2 cm in length by 1.1 cm in diameter.  The external surface is blue-gray with no significant discoloration.  Sectioning reveals three patent vessels with no stricture or thrombus identified.  The membranes insert at the margin and are tan-brown and translucent.    Representative section of peripheral disc is submitted in (block 1A).  Representative section of central disc is submitted in (block 1B).  Representative sections of cord and membranes are " submitted in (block 1C).  JBT/ltw        Microscopic Description --      Sections of umbilical cord reveal the usual three vessel anatomy.  There is no evidence of funisitis in the sections examined.      Sections of placental membranes reveal adherent decidual tissue.  There is no evidence of acute chorioamnionitis.     Sections of placental tissue reveal mature chorionic villi consistent with term gestation.  The villi appear adequately vascularized.  There is patchy intervillous fibrin deposition.  Scattered microcalcifications are seen.    CSW/js       Embedded Images --     CBC & Differential [[id number]] Collected:  01/19/17 0435    Specimen:  Blood Updated:  01/19/17 0524    Narrative:       The following orders were created for panel order CBC & Differential.  Procedure                               Abnormality         Status                     ---------                               -----------         ------                     Manual Differential[[id number]]           Abnormal            Final result               Scan Slide[[id number]]                                        Final result               CBC Auto Differential[[id number]]         Abnormal            Final result                 Please view results for these tests on the individual orders.    CBC Auto Differential [[id number]]  (Abnormal) Collected:  01/19/17 0435    Specimen:  Blood Updated:  01/19/17 0524     WBC 14.13 (H) 10*3/mm3      RBC 3.24 (L) 10*6/mm3      Hemoglobin 9.1 (L) g/dL      Hematocrit 27.0 (L) %      MCV 83.3 fL      MCH 28.1 pg      MCHC 33.7 g/dL      RDW 15.2 (H) %      RDW-SD 44.7 fl      MPV 10.0 fL      Platelets 215 10*3/mm3     Scan Slide [[id number]] Collected:  01/19/17 0435    Specimen:  Blood Updated:  01/19/17 0524     Scan Slide --       See Manual Differential Results       Manual Differential [[id number]]  (Abnormal) Collected:  01/19/17 0435    Specimen:  Blood Updated:  01/19/17 0524     Neutrophil % 61.0 %       Lymphocyte % 25.0 %      Monocyte % 4.0 %      Eosinophil % 4.0 %      Bands %  3.0 %      Atypical Lymphocyte % 3.0 %      Neutrophils Absolute 9.04 (H) 10*3/mm3      Lymphocytes Absolute 3.53 10*3/mm3      Monocytes Absolute 0.57 10*3/mm3      Eosinophils Absolute 0.57 10*3/mm3      RBC Morphology Normal      WBC Morphology Normal      Platelet Morphology Normal           Assessment/Plan      Active Problems:    Normal labor      Assessment:    Veda Fischer is Day 3  post-partum  , Low Transverse   SECTION  BLOOD LOSS 1000ML .       Plan:  continue post op care and monitor for symptoms of anemia. Patient would like to stay one more day due to transfusion and recent symptoms of her anemia. Appropriate rise in Hgb/Hct s/p 3 units.        Curt Julien MD  2017  8:08 AM

## 2017-01-19 NOTE — PLAN OF CARE
Problem: Patient Care Overview (Adult)  Goal: Plan of Care Review  Outcome: Ongoing (interventions implemented as appropriate)  Goal: Adult Individualization and Mutuality  Outcome: Ongoing (interventions implemented as appropriate)  Goal: Discharge Needs Assessment  Outcome: Ongoing (interventions implemented as appropriate)    Problem: Postpartum ( Delivery) (Adult)  Goal: Signs and Symptoms of Listed Potential Problems Will be Absent or Manageable (Postpartum)  Outcome: Ongoing (interventions implemented as appropriate)

## 2017-01-20 VITALS
SYSTOLIC BLOOD PRESSURE: 124 MMHG | HEART RATE: 90 BPM | HEIGHT: 60 IN | RESPIRATION RATE: 16 BRPM | DIASTOLIC BLOOD PRESSURE: 60 MMHG | OXYGEN SATURATION: 98 % | TEMPERATURE: 98.5 F | WEIGHT: 227 LBS | BODY MASS INDEX: 44.57 KG/M2

## 2017-01-20 RX ORDER — OXYCODONE AND ACETAMINOPHEN 7.5; 325 MG/1; MG/1
1 TABLET ORAL EVERY 4 HOURS PRN
Qty: 30 TABLET | Refills: 0 | Status: SHIPPED | OUTPATIENT
Start: 2017-01-20 | End: 2017-01-28

## 2017-01-20 RX ADMIN — OXYCODONE HYDROCHLORIDE AND ACETAMINOPHEN 1 TABLET: 10; 325 TABLET ORAL at 00:47

## 2017-01-20 RX ADMIN — DOCUSATE SODIUM 100 MG: 100 CAPSULE ORAL at 09:46

## 2017-01-20 RX ADMIN — IBUPROFEN 800 MG: 800 TABLET, FILM COATED ORAL at 07:14

## 2017-01-20 RX ADMIN — PRENATAL VIT W/ FE FUMARATE-FA TAB 27-0.8 MG 1 TABLET: 27-0.8 TAB at 09:46

## 2017-01-20 RX ADMIN — OXYCODONE HYDROCHLORIDE AND ACETAMINOPHEN 1 TABLET: 10; 325 TABLET ORAL at 07:14

## 2017-01-20 NOTE — PROGRESS NOTES
[facility]   PROGRESS NOTE    Post-Op Day 4 S/P   Subjective     Patient reports:  Pain is well controlled with prescription NSAID's including ibuprofen (Motrin) and narcotic analgesics including oxycodone/acetaminophen (Percocet, Tylox).  She is ambulating. Tolerating diet. Voiding - without difficulty; flatus reported..  Vaginal bleeding is as much as expected.      Objective      Vitals: Vital Signs Range for the last 24 hours  Temperature: Temp:  [97 °F (36.1 °C)-98.6 °F (37 °C)] 97.4 °F (36.3 °C)   Temp Source: Temp src: Temporal Artery    BP: BP: (120-139)/(52-97) 139/78   Pulse: Heart Rate:  [75-99] 89   Respirations: Resp:  [16-18] 18   SPO2: SpO2:  [97 %-100 %] 98 %   O2 Amount (l/min):     O2 Devices O2 Device: room air   Weight:              Physical Exam    Lungs clear to auscultation bilaterally   Abdomen Soft, non-tender, normal bowel sounds; no bruits, organomegaly or masses.   Incision  healing well, no drainage, no erythema, no hernia, no seroma, no swelling, well approximated   Extremities extremities normal, atraumatic, no cyanosis or edema     I reviewed the patient's new clinical results.  Lab Results (last 24 hours)     ** No results found for the last 24 hours. **          Assessment/Plan      Active Problems:    Normal labor      Assessment:    Veda Fischer is Day 4  post-partum  , Low Transverse   SECTION  BLOOD LOSS 1000ML .       Plan:  plan for discharge today.        Phill Ashley MD  2017  6:32 AM

## 2017-01-20 NOTE — DISCHARGE SUMMARY
Discharge Summary     Nikhil Fischer  : 1990  MRN: [id number]  CSN: [id number]    Date of Admission: 2017   Date of Discharge:  2017   Delivering Physician: Phill Ashley MD       Admission Diagnosis: 1. Normal labor [O80, Z37.9]   Discharge Diagnosis: 1. Pregnancy at 39w5d - delivered       Procedures: 1. Primary  (LTCS)     Hospital Course  See the completed operative report for details regarding antepartum course and delivery.    Her post-operative course was unremarkable.  On POD # 4 she felt like she ready ready for D/C.  She was evaluated by Dr. Ashley who agreed she was able to be discharged to home.  She had no febrile morbidity. She had normal bowel and bladder function and was hemodynamically stable.  Her wound was healing well without obvious signs of infections.    Infant  male  fetus weighing 8 lb (3.63 kg)   Apgars -  8  @ 1 minute /  9  @ 5 minutes.    Discharge labs  Lab Results   Component Value Date    WBC 14.13 (H) 2017    HGB 9.1 (L) 2017    HCT 27.0 (L) 2017     2017       Discharge Medications   Veda Fischer   Home Medication Instructions CHRISSY:[id number]    Printed on:17 0633   Medication Information                      oxyCODONE-acetaminophen (PERCOCET) 7.5-325 MG per tablet  Take 1 tablet by mouth Every 4 (Four) Hours As Needed for moderate pain (4-6) for up to 8 days.             Prenatal Multivit-Min-Fe-FA (PRENATAL VITAMINS PO)  Take 1 tablet by mouth daily.                 Discharge Disposition Home or Self Care   Condition on Discharge: good   Follow-up: 2 weeks with Dion Ashley MD  2017

## 2017-01-20 NOTE — PLAN OF CARE
Problem: Patient Care Overview (Adult)  Goal: Plan of Care Review  Outcome: Ongoing (interventions implemented as appropriate)    17 0612   Coping/Psychosocial Response Interventions   Plan Of Care Reviewed With patient   Patient Care Overview   Progress improving   Outcome Evaluation   Outcome Summary/Follow up Plan vss. pumping more breastmilk. one small clot this shift.       Goal: Adult Individualization and Mutuality  Outcome: Ongoing (interventions implemented as appropriate)  Goal: Discharge Needs Assessment  Outcome: Ongoing (interventions implemented as appropriate)    Problem: Postpartum ( Delivery) (Adult)  Goal: Signs and Symptoms of Listed Potential Problems Will be Absent or Manageable (Postpartum)  Outcome: Ongoing (interventions implemented as appropriate)

## 2017-01-20 NOTE — PLAN OF CARE
Problem: Patient Care Overview (Adult)  Goal: Plan of Care Review  Outcome: Ongoing (interventions implemented as appropriate)  Pt voiding, FFUUML, scant lochia, bonding well with infant and ambulating. Pain well tolerated with po meds.   Goal: Adult Individualization and Mutuality  Outcome: Ongoing (interventions implemented as appropriate)  Goal: Discharge Needs Assessment  Outcome: Ongoing (interventions implemented as appropriate)    Problem: Postpartum ( Delivery) (Adult)  Goal: Signs and Symptoms of Listed Potential Problems Will be Absent or Manageable (Postpartum)  Outcome: Ongoing (interventions implemented as appropriate)

## 2017-01-20 NOTE — PLAN OF CARE
Patient Care Overview (Adult)    • Plan of Care Review Outcome(s) achieved    • Adult Individualization and Mutuality Outcome(s) achieved    • Discharge Needs Assessment Outcome(s) achieved        Postpartum ( Delivery) (Adult)    • Signs and Symptoms of Listed Potential Problems Will be Absent or Manageable (Postpartum) Outcome(s) achieved        Pt ready for d/c home

## 2017-01-20 NOTE — PAYOR COMM NOTE
"REF#   [id number]     DISCHARGE NOTIFICATION     MATERNITY STAY  MOM/ BABY DC'D TO HOME  2017    SHAKIRA DO   P: [phone]    F: [phone]    Veda Stanley (26 y.o. Female)     Date of Birth Social Security Number Address Home Phone MRN    1990  [id number] [facility] [address] [phone] [id number]    [employer] Mar[employer] Status          Other Single       Admission Date Admission Type Admitting Provider Attending Provider Department, Room/Bed    17 Elective Anne Castro MD  [facility] MOTHER BABY 2A, M222/    Discharge Date Discharge Disposition Discharge Destination        2017 Home or Self Care        Attending Provider: (none)    Allergies:  Nuts    Isolation:  None   Infection:  None   Code Status:  FULL    Ht:  60\" (152.4 cm)   Wt:  227 lb (103 kg)    Admission Cmt:  None   Principal Problem:  Normal labor [O80,Z37.9]            Active Insurance as of 2017     Primary Coverage     Payor Plan Insurance Group Employer/Plan Group    HU[employer] MEDICAID HU[employer] [facility]     Payor Plan Address Payor Plan Phone Number Effective From Effective To    PO  [phone] 2016     [place] [address]       Subscriber Name Subscriber Birth Date Member ID       VEDA STANLEY 1990 [id number]         Emergency Contacts      (Rel.) Home Phone Work Phone Mobile Phone    Molly Stanley (Mother) [phone] -- --    Deon Sutherland (Friend) [phone] -- --             Discharge Summary      Phill Ashley MD at 2017  6:33 AM          Discharge Summary     Nikhil Stanley  : 1990  MRN: [id number]  CSN: [id number]    Date of Admission: 2017   Date of Discharge:  2017   Delivering Physician: Phill Ashley MD       Admission Diagnosis: 1. Normal labor [O80, Z37.9]   Discharge Diagnosis: 1. Pregnancy at 39w5d - delivered       Procedures: 1. Primary  (LTCS)     Hospital Course  See the " completed operative report for details regarding antepartum course and delivery.    Her post-operative course was unremarkable.  On POD # 4 she felt like she ready ready for D/C.  She was evaluated by Dr. Ashley who agreed she was able to be discharged to home.  She had no febrile morbidity. She had normal bowel and bladder function and was hemodynamically stable.  Her wound was healing well without obvious signs of infections.    Infant  male  fetus weighing 8 lb (3.63 kg)   Apgars -  8  @ 1 minute /  9  @ 5 minutes.    Discharge labs  Lab Results   Component Value Date    WBC 14.13 (H) 01/19/2017    HGB 9.1 (L) 01/19/2017    HCT 27.0 (L) 01/19/2017     01/19/2017       Discharge Medications   Veda Fischer RAZA   Home Medication Instructions CHRISSY:[id number]    Printed on:01/20/17 0633   Medication Information                      oxyCODONE-acetaminophen (PERCOCET) 7.5-325 MG per tablet  Take 1 tablet by mouth Every 4 (Four) Hours As Needed for moderate pain (4-6) for up to 8 days.             Prenatal Multivit-Min-Fe-FA (PRENATAL VITAMINS PO)  Take 1 tablet by mouth daily.                 Discharge Disposition Home or Self Care   Condition on Discharge: good   Follow-up: 2 weeks with Dion Ashley MD  1/20/2017       Electronically signed by Phill Ashley MD at 1/20/2017  6:34 AM

## 2017-01-20 NOTE — PAYOR COMM NOTE
"ND HOME 17    [id number]  Veda Stanley (26 y.o. Female)     Date of Birth Social Security Number Address Home Phone MRN    1990  [id number] [address] [phone] [id number]    [employer] Marital Status          Other Single       Admission Date Admission Type Admitting Provider Attending Provider Department, Room/Bed    17 Elective Anne Castro MD  [facility] MOTHER BABY 2A, M222/    Discharge Date Discharge Disposition Discharge Destination        2017 Home or Self Care             Attending Provider: (none)    Allergies:  Nuts    Isolation:  None   Infection:  None   Code Status:  FULL    Ht:  60\" (152.4 cm)   Wt:  227 lb (103 kg)    Admission Cmt:  None   Principal Problem:  Normal labor [O80,Z37.9]                 Active Insurance as of 2017     Primary Coverage     Payor Plan Insurance Group Employer/Plan Group    [employer] MEDICAID [employer] [id number]     Payor Plan Address Payor Plan Phone Number Effective From Effective To    PO  [phone] 2016     [place] [address]       Subscriber Name Subscriber Birth Date Member ID       VEDA STANLEY 1990 [id number]                 Emergency Contacts      (Rel.) Home Phone Work Phone Mobile Phone    Molly Stanley (Mother) [phone] -- --    Deon Sutherland (Friend) [phone] -- --               Discharge Summary      Phill Ashley MD at 2017  6:33 AM          Discharge Summary    [facility]  Veda Stanley  : 1990  MRN: [id number]  CSN: [id number]    Date of Admission: 2017   Date of Discharge:  2017   Delivering Physician: Phill Ashley MD       Admission Diagnosis: 1. Normal labor [O80, Z37.9]   Discharge Diagnosis: 1. Pregnancy at 39w5d - delivered       Procedures: 1. Primary  (LTCS)     Hospital Course  See the completed operative report for details regarding antepartum course and delivery.    Her post-operative course " was unremarkable.  On POD # 4 she felt like she ready ready for D/C.  She was evaluated by Dr. Ashley who agreed she was able to be discharged to home.  She had no febrile morbidity. She had normal bowel and bladder function and was hemodynamically stable.  Her wound was healing well without obvious signs of infections.    Infant  male  fetus weighing 8 lb (3.63 kg)   Apgars -  8  @ 1 minute /  9  @ 5 minutes.    Discharge labs  Lab Results   Component Value Date    WBC 14.13 (H) 01/19/2017    HGB 9.1 (L) 01/19/2017    HCT 27.0 (L) 01/19/2017     01/19/2017       Discharge Medications   Veda Fischer   Home Medication Instructions CHRISSY:[id number]    Printed on:01/20/17 0633   Medication Information                      oxyCODONE-acetaminophen (PERCOCET) 7.5-325 MG per tablet  Take 1 tablet by mouth Every 4 (Four) Hours As Needed for moderate pain (4-6) for up to 8 days.             Prenatal Multivit-Min-Fe-FA (PRENATAL VITAMINS PO)  Take 1 tablet by mouth daily.                 Discharge Disposition Home or Self Care   Condition on Discharge: good   Follow-up: 2 weeks with Dion Ashley MD  1/20/2017       Electronically signed by Phill Ashley MD at 1/20/2017  6:34 AM

## 2017-01-21 ENCOUNTER — APPOINTMENT (OUTPATIENT)
Dept: LACTATION | Facility: HOSPITAL | Age: 27
End: 2017-01-21

## 2017-01-25 ENCOUNTER — TELEPHONE (OUTPATIENT)
Dept: LACTATION | Facility: HOSPITAL | Age: 27
End: 2017-01-25

## 2017-01-25 NOTE — TELEPHONE ENCOUNTER
Infant, Magdiel, 9 days    Veda says infant BFing well with shield. No problems. Feed every 2.5 - 3.5 hours. Saw Ped on Monday, 1/23/17, he had gained 4 ounces. Infant began BFing last 3 days. Mom fed only breast last night and today. No bottle - feeding necessary. Says is very encouraged that infant finally became coordinated with suckling. Milk supply good per Veda. She voiced much appreciation for help she obtained from RUBIN Meredith RN in outpatient clinic when liberty feeder was introduced. Says with one day's use of this type of bottle, Magdiel began to drink from it well and transitioned to breast fairly easily. Much encouragement given.

## 2017-02-03 ENCOUNTER — POSTPARTUM VISIT (OUTPATIENT)
Dept: OBSTETRICS AND GYNECOLOGY | Facility: CLINIC | Age: 27
End: 2017-02-03

## 2017-02-03 VITALS
HEIGHT: 60 IN | SYSTOLIC BLOOD PRESSURE: 118 MMHG | WEIGHT: 198 LBS | DIASTOLIC BLOOD PRESSURE: 65 MMHG | BODY MASS INDEX: 38.87 KG/M2

## 2017-02-03 DIAGNOSIS — Z09 POSTOP CHECK: Primary | ICD-10-CM

## 2017-02-03 DIAGNOSIS — Z78.9 NON-SMOKER: ICD-10-CM

## 2017-02-03 PROCEDURE — 99024 POSTOP FOLLOW-UP VISIT: CPT | Performed by: OBSTETRICS & GYNECOLOGY

## 2017-02-03 NOTE — PROGRESS NOTES
"Subjective   Chief Complaint   Patient presents with   • Postpartum Care     Pt is here today for Postpartum visit.  Pt had a  on 17 donna Veloz.  Pt states that she is still sore and tender on the lower right pelvic side but thinks that to having co much coughing.    • Contraception     Pt states that she is breast feeding and does want to start on some BCP.       Veda Fischer is a 26 y.o. year old  presenting to be seen for her post-operative visit.  She had a  2-3 weeks ago.  Currently she reports no problems with eating, bowel movements, voiding, or wound drainage and pain is well controlled.    Pathology showed benign    No Additional Complaints Reported    The following portions of the patient's history were reviewed and updated as appropriate:problem list, current medications, allergies, past family history, past medical history, past social history and past surgical history    Review of Systems      Objective   Visit Vitals   • /65 (BP Location: Right arm, Patient Position: Sitting, Cuff Size: Adult)   • Ht 60\" (152.4 cm)   • Wt 198 lb (89.8 kg)   • Breastfeeding Yes   • BMI 38.67 kg/m2       General:  well developed; well nourished  no acute distress   Abdomen: soft, non-tender; bowel sounds normal; no masses, no organomegaly   Pelvis: Not performed.     Inc C/D/I     Assessment   1. Post op follow up s/p C-Scetion performed 2-3 weeks ago for Arrest of decent     Plan   1. RTO 4 weeks  2. BC discussion then    No orders of the defined types were placed in this encounter.         This note was electronically signed.    Phill Ashley MD  February 3, 2017  "

## 2017-03-03 ENCOUNTER — OFFICE VISIT (OUTPATIENT)
Dept: OBSTETRICS AND GYNECOLOGY | Facility: CLINIC | Age: 27
End: 2017-03-03

## 2017-03-03 VITALS
DIASTOLIC BLOOD PRESSURE: 80 MMHG | WEIGHT: 200 LBS | SYSTOLIC BLOOD PRESSURE: 110 MMHG | HEIGHT: 60 IN | BODY MASS INDEX: 39.27 KG/M2

## 2017-03-03 DIAGNOSIS — Z30.011 ENCOUNTER FOR INITIAL PRESCRIPTION OF CONTRACEPTIVE PILLS: ICD-10-CM

## 2017-03-03 DIAGNOSIS — Z78.9 NON-SMOKER: ICD-10-CM

## 2017-03-03 DIAGNOSIS — Z09 POSTOP CHECK: Primary | ICD-10-CM

## 2017-03-03 PROCEDURE — 99213 OFFICE O/P EST LOW 20 MIN: CPT | Performed by: OBSTETRICS & GYNECOLOGY

## 2017-03-03 RX ORDER — ACETAMINOPHEN AND CODEINE PHOSPHATE 120; 12 MG/5ML; MG/5ML
1 SOLUTION ORAL DAILY
Qty: 28 TABLET | Refills: 12 | Status: SHIPPED | OUTPATIENT
Start: 2017-03-03 | End: 2018-03-03

## 2017-03-03 NOTE — PROGRESS NOTES
Subjective   Veda Fischer is a 26 y.o. female is here today for follow-up.  S/P C section 6 weeks ago.  Breast feeding.  BC options discussed with patient at length.    History of Present Illness    The following portions of the patient's history were reviewed and updated as appropriate: allergies, current medications, past family history, past medical history, past social history, past surgical history and problem list.    Review of Systems   Constitutional: Negative for fever and unexpected weight change.   HENT: Negative for postnasal drip and rhinorrhea.    Eyes: Negative for photophobia and visual disturbance.   Respiratory: Negative for cough, choking and chest tightness.    Cardiovascular: Negative for chest pain and leg swelling.   Gastrointestinal: Negative for constipation, diarrhea, nausea and vomiting.   Endocrine: Negative for cold intolerance and heat intolerance.   Genitourinary: Negative for dyspareunia, menstrual problem, pelvic pain, vaginal bleeding and vaginal discharge.   Musculoskeletal: Negative for back pain and gait problem.   Neurological: Negative for light-headedness, numbness and headaches.   Psychiatric/Behavioral: Negative for confusion, decreased concentration and dysphoric mood.       Objective   Physical Exam   Constitutional: She is oriented to person, place, and time. She appears well-developed and well-nourished.   Cardiovascular: Normal rate.    Pulmonary/Chest: Effort normal.   Abdominal: Soft. Bowel sounds are normal.       Neurological: She is alert and oriented to person, place, and time.   Skin: Skin is warm and dry.   Psychiatric: She has a normal mood and affect. Her behavior is normal. Judgment and thought content normal.         Assessment/Plan   Veda was seen today for postpartum care and post-op.    Diagnoses and all orders for this visit:    Postop check    Non-smoker    Encounter for initial prescription of contraceptive pills  -     norethindrone (MICRONOR)  0.35 MG tablet; Take 1 tablet by mouth Daily.        Phill Ashley MD

## 2017-04-26 ENCOUNTER — TELEPHONE (OUTPATIENT)
Dept: LACTATION | Facility: HOSPITAL | Age: 27
End: 2017-04-26

## 2017-04-26 NOTE — TELEPHONE ENCOUNTER
Magdiel, boy, 3 months    Msg left regarding BF status with offer to help if needed. Encouraged pt to continue to BF for 6 months. Invited her to [employer].

## 2017-05-12 ENCOUNTER — HOSPITAL ENCOUNTER (EMERGENCY)
Facility: HOSPITAL | Age: 27
Discharge: HOME | End: 2017-05-12
Admitting: EMERGENCY MEDICINE

## 2017-05-12 ENCOUNTER — APPOINTMENT (OUTPATIENT)
Dept: GENERAL RADIOLOGY | Facility: HOSPITAL | Age: 27
End: 2017-05-12

## 2017-05-12 VITALS
TEMPERATURE: 98.3 F | HEIGHT: 60 IN | RESPIRATION RATE: 16 BRPM | BODY MASS INDEX: 39.27 KG/M2 | SYSTOLIC BLOOD PRESSURE: 103 MMHG | WEIGHT: 200 LBS | DIASTOLIC BLOOD PRESSURE: 57 MMHG | OXYGEN SATURATION: 95 % | HEART RATE: 101 BPM

## 2017-05-12 DIAGNOSIS — K52.9 GASTROENTERITIS PRESUMED INFECTIOUS: Primary | ICD-10-CM

## 2017-05-12 LAB
ALBUMIN SERPL-MCNC: 4.2 G/DL (ref 3.5–5)
ALBUMIN/GLOB SERPL: 1.2 G/DL (ref 1.1–2.5)
ALP SERPL-CCNC: 95 U/L (ref 24–120)
ALT SERPL W P-5'-P-CCNC: 21 U/L (ref 0–54)
AMYLASE SERPL-CCNC: 66 U/L (ref 30–110)
ANION GAP SERPL CALCULATED.3IONS-SCNC: 15 MMOL/L (ref 4–13)
AST SERPL-CCNC: 22 U/L (ref 7–45)
BACTERIA UR QL AUTO: ABNORMAL /HPF
BASOPHILS # BLD AUTO: 0.02 10*3/MM3 (ref 0–0.2)
BASOPHILS NFR BLD AUTO: 0.2 % (ref 0–2)
BILIRUB SERPL-MCNC: 0.6 MG/DL (ref 0.1–1)
BILIRUB UR QL STRIP: NEGATIVE
BUN BLD-MCNC: 15 MG/DL (ref 5–21)
BUN/CREAT SERPL: 30.6 (ref 7–25)
CALCIUM SPEC-SCNC: 9.7 MG/DL (ref 8.4–10.4)
CHLORIDE SERPL-SCNC: 105 MMOL/L (ref 98–110)
CLARITY UR: CLEAR
CO2 SERPL-SCNC: 23 MMOL/L (ref 24–31)
COLOR UR: YELLOW
CREAT BLD-MCNC: 0.49 MG/DL (ref 0.5–1.4)
DEPRECATED RDW RBC AUTO: 39.9 FL (ref 40–54)
EOSINOPHIL # BLD AUTO: 0.37 10*3/MM3 (ref 0–0.7)
EOSINOPHIL NFR BLD AUTO: 3.6 % (ref 0–4)
ERYTHROCYTE [DISTWIDTH] IN BLOOD BY AUTOMATED COUNT: 14.1 % (ref 12–15)
GFR SERPL CREATININE-BSD FRML MDRD: >150 ML/MIN/1.73
GLOBULIN UR ELPH-MCNC: 3.4 GM/DL
GLUCOSE BLD-MCNC: 103 MG/DL (ref 70–100)
GLUCOSE UR STRIP-MCNC: NEGATIVE MG/DL
HCT VFR BLD AUTO: 43.1 % (ref 37–47)
HGB BLD-MCNC: 14.7 G/DL (ref 12–16)
HGB UR QL STRIP.AUTO: NEGATIVE
HYALINE CASTS UR QL AUTO: ABNORMAL /LPF
IMM GRANULOCYTES # BLD: 0.03 10*3/MM3 (ref 0–0.03)
IMM GRANULOCYTES NFR BLD: 0.3 % (ref 0–5)
KETONES UR QL STRIP: ABNORMAL
LEUKOCYTE ESTERASE UR QL STRIP.AUTO: ABNORMAL
LIPASE SERPL-CCNC: 41 U/L (ref 23–203)
LYMPHOCYTES # BLD AUTO: 0.92 10*3/MM3 (ref 0.72–4.86)
LYMPHOCYTES NFR BLD AUTO: 8.9 % (ref 15–45)
MCH RBC QN AUTO: 26.2 PG (ref 28–32)
MCHC RBC AUTO-ENTMCNC: 34.1 G/DL (ref 33–36)
MCV RBC AUTO: 76.7 FL (ref 82–98)
MONOCYTES # BLD AUTO: 0.65 10*3/MM3 (ref 0.19–1.3)
MONOCYTES NFR BLD AUTO: 6.3 % (ref 4–12)
NEUTROPHILS # BLD AUTO: 8.33 10*3/MM3 (ref 1.87–8.4)
NEUTROPHILS NFR BLD AUTO: 80.7 % (ref 39–78)
NITRITE UR QL STRIP: NEGATIVE
PH UR STRIP.AUTO: 5.5 [PH] (ref 5–8)
PLATELET # BLD AUTO: 306 10*3/MM3 (ref 130–400)
PMV BLD AUTO: 10.7 FL (ref 6–12)
POTASSIUM BLD-SCNC: 4.1 MMOL/L (ref 3.5–5.3)
PROT SERPL-MCNC: 7.6 G/DL (ref 6.3–8.7)
PROT UR QL STRIP: NEGATIVE
RBC # BLD AUTO: 5.62 10*6/MM3 (ref 4.2–5.4)
RBC # UR: ABNORMAL /HPF
REF LAB TEST METHOD: ABNORMAL
SODIUM BLD-SCNC: 143 MMOL/L (ref 135–145)
SP GR UR STRIP: >=1.03 (ref 1–1.03)
SQUAMOUS #/AREA URNS HPF: ABNORMAL /HPF
UROBILINOGEN UR QL STRIP: ABNORMAL
WBC NRBC COR # BLD: 10.32 10*3/MM3 (ref 4.8–10.8)
WBC UR QL AUTO: ABNORMAL /HPF

## 2017-05-12 PROCEDURE — 85025 COMPLETE CBC W/AUTO DIFF WBC: CPT | Performed by: PHYSICIAN ASSISTANT

## 2017-05-12 PROCEDURE — 83690 ASSAY OF LIPASE: CPT | Performed by: PHYSICIAN ASSISTANT

## 2017-05-12 PROCEDURE — 96361 HYDRATE IV INFUSION ADD-ON: CPT

## 2017-05-12 PROCEDURE — 74020 HC XR ABDOMEN FLAT & UPRIGHT: CPT

## 2017-05-12 PROCEDURE — 99283 EMERGENCY DEPT VISIT LOW MDM: CPT

## 2017-05-12 PROCEDURE — 96375 TX/PRO/DX INJ NEW DRUG ADDON: CPT

## 2017-05-12 PROCEDURE — 96374 THER/PROPH/DIAG INJ IV PUSH: CPT

## 2017-05-12 PROCEDURE — 25010000002 ONDANSETRON PER 1 MG: Performed by: EMERGENCY MEDICINE

## 2017-05-12 PROCEDURE — 96376 TX/PRO/DX INJ SAME DRUG ADON: CPT

## 2017-05-12 PROCEDURE — 82150 ASSAY OF AMYLASE: CPT | Performed by: PHYSICIAN ASSISTANT

## 2017-05-12 PROCEDURE — 25010000002 MORPHINE PER 10 MG: Performed by: EMERGENCY MEDICINE

## 2017-05-12 PROCEDURE — 81001 URINALYSIS AUTO W/SCOPE: CPT | Performed by: PHYSICIAN ASSISTANT

## 2017-05-12 PROCEDURE — 25010000002 ONDANSETRON PER 1 MG: Performed by: PHYSICIAN ASSISTANT

## 2017-05-12 PROCEDURE — 80053 COMPREHEN METABOLIC PANEL: CPT | Performed by: PHYSICIAN ASSISTANT

## 2017-05-12 RX ORDER — MORPHINE SULFATE 4 MG/ML
4 INJECTION, SOLUTION INTRAMUSCULAR; INTRAVENOUS ONCE
Status: COMPLETED | OUTPATIENT
Start: 2017-05-12 | End: 2017-05-12

## 2017-05-12 RX ORDER — ONDANSETRON 4 MG/1
4 TABLET, FILM COATED ORAL EVERY 4 HOURS PRN
Qty: 20 TABLET | Refills: 0 | Status: SHIPPED | OUTPATIENT
Start: 2017-05-12 | End: 2017-07-06

## 2017-05-12 RX ORDER — SODIUM CHLORIDE 9 MG/ML
125 INJECTION, SOLUTION INTRAVENOUS CONTINUOUS
Status: DISCONTINUED | OUTPATIENT
Start: 2017-05-12 | End: 2017-05-12 | Stop reason: HOSPADM

## 2017-05-12 RX ORDER — FAMOTIDINE 10 MG/ML
20 INJECTION, SOLUTION INTRAVENOUS ONCE
Status: COMPLETED | OUTPATIENT
Start: 2017-05-12 | End: 2017-05-12

## 2017-05-12 RX ORDER — ONDANSETRON 2 MG/ML
4 INJECTION INTRAMUSCULAR; INTRAVENOUS ONCE
Status: COMPLETED | OUTPATIENT
Start: 2017-05-12 | End: 2017-05-12

## 2017-05-12 RX ORDER — FAMOTIDINE 40 MG/1
40 TABLET, FILM COATED ORAL DAILY
Qty: 20 TABLET | Refills: 0 | Status: SHIPPED | OUTPATIENT
Start: 2017-05-12

## 2017-05-12 RX ADMIN — MORPHINE SULFATE 4 MG: 4 INJECTION, SOLUTION INTRAMUSCULAR; INTRAVENOUS at 08:55

## 2017-05-12 RX ADMIN — FAMOTIDINE 20 MG: 10 INJECTION, SOLUTION INTRAVENOUS at 08:51

## 2017-05-12 RX ADMIN — ONDANSETRON 4 MG: 2 INJECTION INTRAMUSCULAR; INTRAVENOUS at 08:42

## 2017-05-12 RX ADMIN — MORPHINE SULFATE 4 MG: 4 INJECTION, SOLUTION INTRAMUSCULAR; INTRAVENOUS at 10:29

## 2017-05-12 RX ADMIN — SODIUM CHLORIDE 1000 ML: 9 INJECTION, SOLUTION INTRAVENOUS at 08:56

## 2017-05-12 RX ADMIN — ONDANSETRON 4 MG: 2 INJECTION INTRAMUSCULAR; INTRAVENOUS at 10:29

## 2017-05-16 ENCOUNTER — TRANSCRIBE ORDERS (OUTPATIENT)
Dept: ADMINISTRATIVE | Facility: HOSPITAL | Age: 27
End: 2017-05-16

## 2017-05-16 ENCOUNTER — APPOINTMENT (OUTPATIENT)
Dept: LAB | Facility: HOSPITAL | Age: 27
End: 2017-05-16

## 2017-05-16 DIAGNOSIS — A09 GASTROENTERITIS/COLITIS, INFECTIOUS: Primary | ICD-10-CM

## 2017-05-16 LAB
ADV 40+41 DNA STL QL NAA+NON-PROBE: NOT DETECTED
ASTRO TYP 1-8 RNA STL QL NAA+NON-PROBE: NOT DETECTED
C CAYETANENSIS DNA STL QL NAA+NON-PROBE: NOT DETECTED
C DIFF TOX GENS STL QL NAA+PROBE: NOT DETECTED
CAMPY SP DNA.DIARRHEA STL QL NAA+PROBE: NOT DETECTED
CRYPTOSP STL CULT: NOT DETECTED
E COLI DNA SPEC QL NAA+PROBE: NOT DETECTED
E HISTOLYT AG STL-ACNC: NOT DETECTED
EAEC PAA PLAS AGGR+AATA ST NAA+NON-PRB: NOT DETECTED
EC STX1+STX2 GENES STL QL NAA+NON-PROBE: NOT DETECTED
EPEC EAE GENE STL QL NAA+NON-PROBE: NOT DETECTED
ETEC LTA+ST1A+ST1B TOX ST NAA+NON-PROBE: NOT DETECTED
G LAMBLIA DNA SPEC QL NAA+PROBE: NOT DETECTED
NOROVIRUS GI+II RNA STL QL NAA+NON-PROBE: DETECTED
P SHIGELLOIDES DNA STL QL NAA+NON-PROBE: NOT DETECTED
RV RNA STL NAA+PROBE: NOT DETECTED
SALMONELLA DNA SPEC QL NAA+PROBE: NOT DETECTED
SAPO I+II+IV+V RNA STL QL NAA+NON-PROBE: NOT DETECTED
SHIGELLA SP+EIEC IPAH ST NAA+NON-PROBE: NOT DETECTED
V CHOLERAE DNA SPEC QL NAA+PROBE: NOT DETECTED
VIBRIO DNA SPEC NAA+PROBE: NOT DETECTED
YERSINIA STL CULT: NOT DETECTED

## 2017-05-16 PROCEDURE — 87999 UNLISTED MICROBIOLOGY PX: CPT | Performed by: PHYSICIAN ASSISTANT

## 2017-07-06 ENCOUNTER — OFFICE VISIT (OUTPATIENT)
Dept: OBSTETRICS AND GYNECOLOGY | Facility: CLINIC | Age: 27
End: 2017-07-06

## 2017-07-06 VITALS
BODY MASS INDEX: 39.66 KG/M2 | WEIGHT: 202 LBS | HEIGHT: 60 IN | SYSTOLIC BLOOD PRESSURE: 100 MMHG | DIASTOLIC BLOOD PRESSURE: 70 MMHG

## 2017-07-06 DIAGNOSIS — Z01.419 VISIT FOR GYNECOLOGIC EXAMINATION: Primary | ICD-10-CM

## 2017-07-06 DIAGNOSIS — Z30.40 ENCOUNTER FOR SURVEILLANCE OF CONTRACEPTIVES: ICD-10-CM

## 2017-07-06 PROCEDURE — G0123 SCREEN CERV/VAG THIN LAYER: HCPCS | Performed by: NURSE PRACTITIONER

## 2017-07-06 PROCEDURE — 99395 PREV VISIT EST AGE 18-39: CPT | Performed by: NURSE PRACTITIONER

## 2017-07-06 PROCEDURE — 87624 HPV HI-RISK TYP POOLED RSLT: CPT | Performed by: NURSE PRACTITIONER

## 2017-07-06 RX ORDER — ACETAMINOPHEN AND CODEINE PHOSPHATE 120; 12 MG/5ML; MG/5ML
1 SOLUTION ORAL DAILY
Qty: 28 TABLET | Refills: 12 | Status: SHIPPED | OUTPATIENT
Start: 2017-07-06 | End: 2018-07-06

## 2017-07-06 NOTE — PROGRESS NOTES
Subjective   Veda Fischer is a 26 y.o. female is here today as a self referral.    HPI Comments: I'm here for a pap and physical. I also am having some pp depression that I think I need something for.    Gynecologic Exam   The patient's pertinent negatives include no pelvic pain or vaginal discharge. Pertinent negatives include no abdominal pain, back pain, chills, constipation, diarrhea, flank pain, headaches, nausea, rash, sore throat or vomiting.       The following portions of the patient's history were reviewed and updated as appropriate: allergies, current medications, past medical history, past social history, past surgical history and problem list.    Review of Systems   Constitutional: Negative for activity change, appetite change, chills and fatigue.   HENT: Negative for congestion, dental problem, ear pain, hearing loss, nosebleeds, rhinorrhea, sneezing, sore throat and voice change.    Eyes: Negative for discharge, redness, itching and visual disturbance.   Respiratory: Negative for apnea, cough, choking, shortness of breath and wheezing.    Cardiovascular: Negative for chest pain and palpitations.   Gastrointestinal: Negative for abdominal distention, abdominal pain, constipation, diarrhea, nausea and vomiting.   Endocrine: Negative for cold intolerance, heat intolerance and polyuria.   Genitourinary: Negative for difficulty urinating, dyspareunia, flank pain, genital sores, menstrual problem, pelvic pain, vaginal bleeding and vaginal discharge.   Musculoskeletal: Negative for arthralgias, back pain, myalgias and neck pain.   Skin: Negative for pallor and rash.   Allergic/Immunologic: Negative for environmental allergies and food allergies.   Neurological: Negative for dizziness, tremors, seizures, numbness and headaches.   Hematological: Negative for adenopathy. Does not bruise/bleed easily.   Psychiatric/Behavioral: Negative for agitation, decreased concentration, sleep disturbance and suicidal  ideas. The patient is not nervous/anxious.         Depression       Objective   Physical Exam   Constitutional: She is oriented to person, place, and time. She appears well-developed and well-nourished. No distress.   HENT:   Head: Normocephalic and atraumatic.   Right Ear: External ear normal.   Left Ear: External ear normal.   Nose: Nose normal.   Mouth/Throat: Oropharynx is clear and moist.   Eyes: EOM are normal. Pupils are equal, round, and reactive to light.   Neck: Normal range of motion. No thyromegaly present.   Cardiovascular: Normal rate, regular rhythm and normal heart sounds.    No murmur heard.  Pulmonary/Chest: Effort normal and breath sounds normal. No respiratory distress. She has no wheezes. Right breast exhibits no inverted nipple and no mass. Left breast exhibits no inverted nipple and no mass.   Abdominal: Soft. Bowel sounds are normal. There is no tenderness.   Genitourinary: Vagina normal and uterus normal. No breast tenderness. Pelvic exam was performed with patient supine. There is no rash or tenderness on the right labia. There is no rash or tenderness on the left labia. Cervix exhibits no motion tenderness. Right adnexum displays no mass and no tenderness. Left adnexum displays no mass and no tenderness. No bleeding in the vagina. No vaginal discharge found.   Genitourinary Comments: Urethra and urethral meatus normal  Bladder normal no prolapse  Perineum and rectum examined and intact without lesions   Musculoskeletal: Normal range of motion.   Lymphadenopathy:        Right: No inguinal adenopathy present.        Left: No inguinal adenopathy present.   Neurological: She is alert and oriented to person, place, and time. She has normal reflexes.   Skin: Skin is warm and dry.   Psychiatric: She has a normal mood and affect. Her behavior is normal. Judgment and thought content normal.   Nursing note and vitals reviewed.        Assessment/Plan   Veda was seen today for gynecologic  exam.    Diagnoses and all orders for this visit:    Visit for gynecologic examination    Pt is 5 mo pp and breastfeeding. She is doing well on her oc's, and wants to stay on them.   -     Liquid-based Pap Smear, Screening    Encounter for surveillance of contraceptives  -     norethindrone (MICRONOR) 0.35 MG tablet; Take 1 tablet by mouth Daily.    Depression affecting pregnancy, postpartum    Pt has been having some episodes of weepiness and overwhelming.  She denies being suicidal or homicidal. She would like to start on something mild. Willl RTO for reevaluation in one month.  -     sertraline (ZOLOFT) 50 MG tablet; Take 1 tablet by mouth Daily.    BMI 39.0-39.9,adult    She is breastfeeding and eating properly for this. She does not have time presently she states to do any physical exercise. Encouraged to do so to help with mood also.

## 2017-07-06 NOTE — PATIENT INSTRUCTIONS
"Postpartum Depression and Baby Blues  The postpartum period begins right after the birth of a baby. During this time, there is often a great amount of ephraim and excitement. It is also a time of many changes in the life of the parents. Regardless of how many times a mother gives birth, each child brings new challenges and dynamics to the family. It is not unusual to have feelings of excitement along with confusing shifts in moods, emotions, and thoughts. All mothers are at risk of developing postpartum depression or the \"baby blues.\" These mood changes can occur right after giving birth, or they may occur many months after giving birth. The baby blues or postpartum depression can be mild or severe. Additionally, postpartum depression can go away rather quickly, or it can be a long-term condition.   CAUSES  Raised hormone levels and the rapid drop in those levels are thought to be a main cause of postpartum depression and the baby blues. A number of hormones change during and after pregnancy. Estrogen and progesterone usually decrease right after the delivery of your baby. The levels of thyroid hormone and various cortisol steroids also rapidly drop. Other factors that play a role in these mood changes include major life events and genetics.   RISK FACTORS  If you have any of the following risks for the baby blues or postpartum depression, know what symptoms to watch out for during the postpartum period. Risk factors that may increase the likelihood of getting the baby blues or postpartum depression include:  · Having a personal or family history of depression.    · Having depression while being pregnant.    · Having premenstrual mood issues or mood issues related to oral contraceptives.  · Having a lot of life stress.    · Having marital conflict.    · Lacking a social support network.    · Having a baby with special needs.    · Having health problems, such as diabetes.    SIGNS AND SYMPTOMS  Symptoms of baby blues " include:  · Brief changes in mood, such as going from extreme happiness to sadness.  · Decreased concentration.    · Difficulty sleeping.    · Crying spells, tearfulness.    · Irritability.    · Anxiety.    Symptoms of postpartum depression typically begin within the first month after giving birth. These symptoms include:  · Difficulty sleeping or excessive sleepiness.    · Marked weight loss.    · Agitation.    · Feelings of worthlessness.    · Lack of interest in activity or food.    Postpartum psychosis is a very serious condition and can be dangerous. Fortunately, it is rare. Displaying any of the following symptoms is cause for immediate medical attention. Symptoms of postpartum psychosis include:   · Hallucinations and delusions.    · Bizarre or disorganized behavior.    · Confusion or disorientation.    DIAGNOSIS   A diagnosis is made by an evaluation of your symptoms. There are no medical or lab tests that lead to a diagnosis, but there are various questionnaires that a health care provider may use to identify those with the baby blues, postpartum depression, or psychosis. Often, a screening tool called the [place]  Depression Scale is used to diagnose depression in the postpartum period.   TREATMENT  The baby blues usually goes away on its own in 1-2 weeks. Social support is often all that is needed. You will be encouraged to get adequate sleep and rest. Occasionally, you may be given medicines to help you sleep.   Postpartum depression requires treatment because it can last several months or longer if it is not treated. Treatment may include individual or group therapy, medicine, or both to address any social, physiological, and psychological factors that may play a role in the depression. Regular exercise, a healthy diet, rest, and social support may also be strongly recommended.   Postpartum psychosis is more serious and needs treatment right away. Hospitalization is often needed.  HOME CARE  INSTRUCTIONS  · Get as much rest as you can. Nap when the baby sleeps.    · Exercise regularly. Some women find yoga and walking to be beneficial.    · Eat a balanced and nourishing diet.    · Do little things that you enjoy. Have a cup of tea, take a bubble bath, read your favorite magazine, or listen to your favorite music.  · Avoid alcohol.    · Ask for help with household chores, cooking, grocery shopping, or running errands as needed. Do not try to do everything.    · Talk to people close to you about how you are feeling. Get support from your partner, family members, friends, or other new moms.  · Try to stay positive in how you think. Think about the things you are grateful for.    · Do not spend a lot of time alone.    · Only take over-the-counter or prescription medicine as directed by your health care provider.  · Keep all your postpartum appointments.    · Let your health care provider know if you have any concerns.    SEEK MEDICAL CARE IF:  You are having a reaction to or problems with your medicine.  SEEK IMMEDIATE MEDICAL CARE IF:  · You have suicidal feelings.    · You think you may harm the baby or someone else.  MAKE SURE YOU:  · Understand these instructions.  · Will watch your condition.  · Will get help right away if you are not doing well or get worse.     This information is not intended to replace advice given to you by your health care provider. Make sure you discuss any questions you have with your health care provider.     Document Released: 09/21/2005 Document Revised: 12/23/2014 Document Reviewed: 09/29/2014  [employer] Patient Education ©2017 [employer]

## 2017-07-17 LAB
GEN CATEG CVX/VAG CYTO-IMP: ABNORMAL
LAB AP CASE REPORT: ABNORMAL
LAB AP GYN ADDITIONAL INFORMATION: ABNORMAL
LAB AP GYN OTHER FINDINGS: ABNORMAL
Lab: ABNORMAL
PATH INTERP SPEC-IMP: ABNORMAL
STAT OF ADQ CVX/VAG CYTO-IMP: ABNORMAL

## (undated) DEVICE — SUT VIC 0 CT1 36IN J946H

## (undated) DEVICE — GOWN,PREVENTION PLUS,XXLARGE,STERILE: Brand: MEDLINE

## (undated) DEVICE — Device

## (undated) DEVICE — DRSNG PAD ABD 8X10IN STRL

## (undated) DEVICE — GLV SURG SENSICARE SLT PF LF 7.5 STRL

## (undated) DEVICE — SUT MNCRYL 0/0 CTX 36IN Y398H

## (undated) DEVICE — ADHS LIQ MASTISOL 2/3ML

## (undated) DEVICE — APPL CHLORAPREP W/TINT 26ML ORNG

## (undated) DEVICE — SUT MNCRYL 2/0 CT1 36IN UD MCP945H

## (undated) DEVICE — SLV SCD KN ADJ EXPRSS LG

## (undated) DEVICE — LARGE, DISPOSABLE ALEXIS O C-SECTION PROTECTOR - RETRACTOR: Brand: ALEXIS ® O C-SECTION PROTECTOR - RETRACTOR

## (undated) DEVICE — 3M™ STERI-STRIP™ REINFORCED ADHESIVE SKIN CLOSURES, R1547, 1/2 IN X 4 IN (12 MM X 100 MM), 6 STRIPS/ENVELOPE: Brand: 3M™ STERI-STRIP™

## (undated) DEVICE — 3M™ MEDIPORE™ H SOFT CLOTH SURGICAL TAPE 2868, 8 INCH X 10 YARD (20,3CM X 9,1M), 6 ROLLS/CASE: Brand: 3M™ MEDIPORE™

## (undated) DEVICE — ANTIBACTERIAL VIOLET BRAIDED (POLYGLACTIN 910), SYNTHETIC ABSORBABLE SUTURE: Brand: COATED VICRYL

## (undated) DEVICE — STPLR SKIN SUBCUTICULAR INSORB 2030